# Patient Record
Sex: FEMALE | Race: WHITE | Employment: FULL TIME | URBAN - METROPOLITAN AREA
[De-identification: names, ages, dates, MRNs, and addresses within clinical notes are randomized per-mention and may not be internally consistent; named-entity substitution may affect disease eponyms.]

---

## 2019-04-10 ENCOUNTER — TELEPHONE (OUTPATIENT)
Dept: BEHAVIORAL HEALTH | Facility: CLINIC | Age: 54
End: 2019-04-10

## 2019-04-10 ENCOUNTER — HOSPITAL ENCOUNTER (INPATIENT)
Facility: CLINIC | Age: 54
LOS: 6 days | Discharge: HOME | DRG: 885 | End: 2019-04-16
Attending: EMERGENCY MEDICINE | Admitting: PSYCHIATRY & NEUROLOGY
Payer: COMMERCIAL

## 2019-04-10 DIAGNOSIS — G47.00 INSOMNIA, UNSPECIFIED TYPE: ICD-10-CM

## 2019-04-10 DIAGNOSIS — F41.9 ANXIETY: ICD-10-CM

## 2019-04-10 LAB
AMPHETAMINES UR QL SCN: NEGATIVE
ANION GAP SERPL CALCULATED.3IONS-SCNC: 7 MMOL/L (ref 3–14)
BARBITURATES UR QL: NEGATIVE
BENZODIAZ UR QL: NEGATIVE
BUN SERPL-MCNC: 12 MG/DL (ref 7–30)
CALCIUM SERPL-MCNC: 9.3 MG/DL (ref 8.5–10.1)
CANNABINOIDS UR QL SCN: NEGATIVE
CHLORIDE SERPL-SCNC: 107 MMOL/L (ref 94–109)
CO2 SERPL-SCNC: 26 MMOL/L (ref 20–32)
COCAINE UR QL: NEGATIVE
CREAT SERPL-MCNC: 0.8 MG/DL (ref 0.52–1.04)
ERYTHROCYTE [DISTWIDTH] IN BLOOD BY AUTOMATED COUNT: 12.8 % (ref 10–15)
GFR SERPL CREATININE-BSD FRML MDRD: 82 ML/MIN/{1.73_M2}
GLUCOSE SERPL-MCNC: 131 MG/DL (ref 70–99)
HCT VFR BLD AUTO: 40.2 % (ref 35–47)
HGB BLD-MCNC: 13.5 G/DL (ref 11.7–15.7)
INTERPRETATION ECG - MUSE: NORMAL
MCH RBC QN AUTO: 27.8 PG (ref 26.5–33)
MCHC RBC AUTO-ENTMCNC: 33.6 G/DL (ref 31.5–36.5)
MCV RBC AUTO: 83 FL (ref 78–100)
OPIATES UR QL SCN: NEGATIVE
PCP UR QL SCN: NEGATIVE
PLATELET # BLD AUTO: 259 10E9/L (ref 150–450)
POTASSIUM SERPL-SCNC: 3.7 MMOL/L (ref 3.4–5.3)
RBC # BLD AUTO: 4.86 10E12/L (ref 3.8–5.2)
SODIUM SERPL-SCNC: 140 MMOL/L (ref 133–144)
TSH SERPL DL<=0.005 MIU/L-ACNC: 1.06 MU/L (ref 0.4–4)
WBC # BLD AUTO: 7.6 10E9/L (ref 4–11)

## 2019-04-10 PROCEDURE — 84443 ASSAY THYROID STIM HORMONE: CPT | Performed by: EMERGENCY MEDICINE

## 2019-04-10 PROCEDURE — 93005 ELECTROCARDIOGRAM TRACING: CPT

## 2019-04-10 PROCEDURE — 80048 BASIC METABOLIC PNL TOTAL CA: CPT | Performed by: EMERGENCY MEDICINE

## 2019-04-10 PROCEDURE — 25000132 ZZH RX MED GY IP 250 OP 250 PS 637: Performed by: PSYCHIATRY & NEUROLOGY

## 2019-04-10 PROCEDURE — 99285 EMERGENCY DEPT VISIT HI MDM: CPT | Mod: 25

## 2019-04-10 PROCEDURE — 85027 COMPLETE CBC AUTOMATED: CPT | Performed by: EMERGENCY MEDICINE

## 2019-04-10 PROCEDURE — 12400000 ZZH R&B MH

## 2019-04-10 PROCEDURE — 90791 PSYCH DIAGNOSTIC EVALUATION: CPT

## 2019-04-10 PROCEDURE — 80307 DRUG TEST PRSMV CHEM ANLYZR: CPT | Performed by: PSYCHIATRY & NEUROLOGY

## 2019-04-10 RX ORDER — ACETAMINOPHEN 325 MG/1
650 TABLET ORAL EVERY 4 HOURS PRN
Status: DISCONTINUED | OUTPATIENT
Start: 2019-04-10 | End: 2019-04-16 | Stop reason: HOSPADM

## 2019-04-10 RX ORDER — HYDROXYZINE HYDROCHLORIDE 25 MG/1
25 TABLET, FILM COATED ORAL
Status: DISCONTINUED | OUTPATIENT
Start: 2019-04-10 | End: 2019-04-10

## 2019-04-10 RX ORDER — OLANZAPINE 10 MG/2ML
10 INJECTION, POWDER, FOR SOLUTION INTRAMUSCULAR
Status: DISCONTINUED | OUTPATIENT
Start: 2019-04-10 | End: 2019-04-10

## 2019-04-10 RX ORDER — PROPRANOLOL HYDROCHLORIDE 10 MG/1
10 TABLET ORAL 3 TIMES DAILY PRN
Status: ON HOLD | COMMUNITY
End: 2019-04-16

## 2019-04-10 RX ORDER — QUETIAPINE FUMARATE 25 MG/1
25 TABLET, FILM COATED ORAL AT BEDTIME
Status: DISCONTINUED | OUTPATIENT
Start: 2019-04-10 | End: 2019-04-12

## 2019-04-10 RX ORDER — BUSPIRONE HYDROCHLORIDE 10 MG/1
10 TABLET ORAL 3 TIMES DAILY
Status: ON HOLD | COMMUNITY
End: 2019-04-16

## 2019-04-10 RX ORDER — PROPRANOLOL HYDROCHLORIDE 10 MG/1
10 TABLET ORAL 3 TIMES DAILY PRN
Status: DISCONTINUED | OUTPATIENT
Start: 2019-04-10 | End: 2019-04-16 | Stop reason: HOSPADM

## 2019-04-10 RX ORDER — FAMOTIDINE 40 MG/1
40 TABLET, FILM COATED ORAL DAILY
Status: ON HOLD | COMMUNITY
End: 2019-04-16

## 2019-04-10 RX ORDER — QUETIAPINE FUMARATE 50 MG/1
25 TABLET, FILM COATED ORAL AT BEDTIME
Status: ON HOLD | COMMUNITY
End: 2019-04-16

## 2019-04-10 RX ORDER — FAMOTIDINE 20 MG/1
40 TABLET, FILM COATED ORAL DAILY
Status: DISCONTINUED | OUTPATIENT
Start: 2019-04-11 | End: 2019-04-11

## 2019-04-10 RX ORDER — QUETIAPINE FUMARATE 25 MG/1
25 TABLET, FILM COATED ORAL EVERY 4 HOURS PRN
Status: DISCONTINUED | OUTPATIENT
Start: 2019-04-10 | End: 2019-04-11

## 2019-04-10 RX ORDER — OLANZAPINE 10 MG/1
10 TABLET, ORALLY DISINTEGRATING ORAL
Status: DISCONTINUED | OUTPATIENT
Start: 2019-04-10 | End: 2019-04-10

## 2019-04-10 RX ORDER — HYDROXYZINE HYDROCHLORIDE 25 MG/1
25 TABLET, FILM COATED ORAL EVERY 4 HOURS PRN
Status: DISCONTINUED | OUTPATIENT
Start: 2019-04-10 | End: 2019-04-11

## 2019-04-10 RX ORDER — LANOLIN ALCOHOL/MO/W.PET/CERES
3 CREAM (GRAM) TOPICAL
Status: DISCONTINUED | OUTPATIENT
Start: 2019-04-10 | End: 2019-04-10

## 2019-04-10 RX ORDER — LORAZEPAM 1 MG/1
1 TABLET ORAL EVERY 8 HOURS PRN
Status: DISCONTINUED | OUTPATIENT
Start: 2019-04-10 | End: 2019-04-10

## 2019-04-10 RX ORDER — ACETAMINOPHEN 325 MG/1
650 TABLET ORAL EVERY 4 HOURS PRN
Status: DISCONTINUED | OUTPATIENT
Start: 2019-04-10 | End: 2019-04-10

## 2019-04-10 RX ORDER — BUSPIRONE HYDROCHLORIDE 10 MG/1
10 TABLET ORAL 3 TIMES DAILY
Status: DISCONTINUED | OUTPATIENT
Start: 2019-04-10 | End: 2019-04-11

## 2019-04-10 RX ADMIN — BUSPIRONE HYDROCHLORIDE 10 MG: 10 TABLET ORAL at 21:12

## 2019-04-10 RX ADMIN — QUETIAPINE 25 MG: 25 TABLET ORAL at 21:12

## 2019-04-10 RX ADMIN — PROPRANOLOL HYDROCHLORIDE 10 MG: 10 TABLET ORAL at 21:12

## 2019-04-10 ASSESSMENT — MIFFLIN-ST. JEOR
SCORE: 1234.13
SCORE: 1244.53

## 2019-04-10 ASSESSMENT — ENCOUNTER SYMPTOMS
TREMORS: 1
NERVOUS/ANXIOUS: 1
UNEXPECTED WEIGHT CHANGE: 1
SLEEP DISTURBANCE: 1
CONSTIPATION: 1
DIAPHORESIS: 1

## 2019-04-10 NOTE — TELEPHONE ENCOUNTER
S: Pt is a 52 yo female in the  ED for anxiety    B: Pt BIB . High anxiety since January. Missing work. Night sweats, poor sleep, changed meds 4 times and nothing has worked. Symptoms worsening. Therapist recommended ED for assessment. No hx of IP MH. Last period ended about same time as when symptoms started so is going through menopause. 4 children,  present and supportive. No SI or SIB. No HI. Biggest concern is racing heart and only sleeping about 3 hours per day. Pt wants to get back to normal life. Pt is not Tachycardic. Pt states symptoms of depression are related to getting no relief from anxiety. Pt appropriate for stepdown admission. No behavioral concerns.     A: No medical concerns, voluntary. Labs within normal parameters.     R: Saira/Chandra

## 2019-04-10 NOTE — PROGRESS NOTES
Celia Kapoor, presented to the ER with anxiety.  She said that anxiety started in January, where she started having symptoms of someone pocking her on her arms and feeling very anxious. Her primary Doctor started her on a medication can not recall the name, that medication gave her insomnia and motion sickness. Then she was switched to another medications , again can not recall the name ,  This medication gave her night sweats and worsening insomnia.then she started taking Lexapro and she developed shakiness . Her Dr, grew impatient with her and he told her to continue taking the medications but she stopped.   She was started on Buspar, and going to see a counselor, her symptoms are worse: Chest pounding, unable to sleep for more than 2 hours, increased anxiety and night sweats.   She denies any personal problems, has a good relationship with her , she has not been able to work because she can only sleep 2 to 3 hours at night.        Today , she saw her counselor, who told her that they can not help her anymore because she is not complaint with medications and they do not have the resources to help her and suggested she come to the Emergency Department.    Denies any suicidal ideation,  She just wants to get better.  Admission completed.

## 2019-04-10 NOTE — PHARMACY-ADMISSION MEDICATION HISTORY
Admission medication history interview status for the 4/10/2019  admission is complete. See EPIC admission navigator for prior to admission medications     Medication history source reliability:Good    Actions taken by pharmacist (provider contacted, etc):pt has Rx bottles here with her      Additional medication history information not noted on PTA med list :None    Medication reconciliation/reorder completed by provider prior to medication history? No    Time spent in this activity: 15 min    Prior to Admission medications    Medication Sig Last Dose Taking? Auth Provider   busPIRone (BUSPAR) 10 MG tablet Take 10 mg by mouth 3 times daily 4/10/2019 at Unknown time Yes Unknown, Entered By History   famotidine (PEPCID) 40 MG tablet Take 40 mg by mouth daily 4/9/2019 at am Yes Unknown, Entered By History   methylcellulose (CITRUCEL) 500 MG TABS tablet Take 1,000 mg by mouth daily as needed 4/10/2019 at Unknown time Yes Unknown, Entered By History   propranolol (INDERAL) 10 MG tablet Take 10 mg by mouth 3 times daily as needed not started Yes Unknown, Entered By History   QUEtiapine (SEROQUEL) 50 MG tablet Take 25 mg by mouth At Bedtime 4/9/2019 at Unknown time Yes Unknown, Entered By History

## 2019-04-10 NOTE — ED TRIAGE NOTES
"Patient states, \"I was diagnosed with anxiety and they started my on some pills. I haven't been able to sleep for days and I feel like my heart is racing, my body hurts, I sweat like 3 times per night and I just need to be stabilized.\"  "

## 2019-04-10 NOTE — ED PROVIDER NOTES
"  History     Chief Complaint:    Anxiety    HPI   Celia Zhong is a 53 year old female who presents with anxiety. The patient reports that on January 22 2019 she was evaluated by her primary care physician because she was \"not feeling right\" where she was told that she had anxiety and prescribed anti-anxiety medications, of which she could not recall the name but notes that it made her have insomnia and motion sickness. She was then switched to another medication but details that it made her have night sweats and worsening insomnia. Finally, the patient states that she was started on a 20 day trial of Lexapro and after taking it for a few days she \"laid on the floor\" from the side affects as it \"kicked me in the butt\" and she was experiencing shaking. She then stopped taking the medication despite her physician telling her that if she kept taking it, it would eventually help and she recalls she was told \"it's me\" that is having the issues with the medications. The patient restarted her medication when she met with a counselor but still states that she was experiencing night sweats and adverse side affects therefore switched to Buspar on March 12. The patient annotates that she feels that the medication's aren't working and she her \"chest is pounding\" with decreased sleep to 2-3 hours a night and night sweats. She reports that \"all I want to do is sleep\" and it is affecting her work and quality of life. The patient's  dictates that at night when she tries to sleep, she starts to think of \"all the problems\" and therefore can not sleep and that the patient is not acting herself. Today, the patient was seen by her counselor who told her that they can not help her anymore because he is noncompliant and they do not have the resources she needs, therefore to come to the emergency department. She endorses also being constipated and having lost 25 pounds and is concerned there is something physically wrong with " "her. She denies suicidal or self harm, reporting that \"I just want to get better\".     Allergies:  No known drug allergies.       Medications:    Buspar     Past Medical History:    Anxiety    Past Surgical History:    Tubal ligation     Family History:    Family history reviewed. No pertinent family history.     Social History:  The patient was accompanied to the ED by .  Smoking Status: Never Smoker  Smokeless Tobacco: Never Used  Alcohol Use: Positive  Drug Use: Negative  Marital Status:        Review of Systems   Constitutional: Positive for diaphoresis (night sweats) and unexpected weight change.        Motion sickness   Gastrointestinal: Positive for constipation.   Neurological: Positive for tremors.   Psychiatric/Behavioral: Positive for sleep disturbance. Negative for self-injury and suicidal ideas. The patient is nervous/anxious.    All other systems reviewed and are negative.    Physical Exam     Patient Vitals for the past 24 hrs:   BP Temp Pulse Resp SpO2 Height Weight   04/10/19 1217 (!) 166/91 98.4  F (36.9  C) 77 18 99 % 1.6 m (5' 3\") 68 kg (150 lb)      Physical Exam  Eyes:  The pupils are equal and round    Conjunctivae and sclerae are normal  ENT:    The nose is normal    Pinnae are normal  CV:  Regular rate and rhythm     No edema  Resp:  Lungs are clear    Non-labored    No rales    No wheezing   GI:  Abdomen is soft and non-tender, there is no rigidity    No distension  MS:  Normal muscular tone    No asymmetric leg swelling  Skin:  No rash or acute skin lesions noted  Neuro:   Awake, alert.      Speech is normal and fluent.    Face is symmetric.     Moves all extremities  Psych:  Anxious appearing. Tearful.  Denies SI/HI.      Emergency Department Course     ECG: Pending    Laboratory:  Laboratory findings were communicated with the patient who voiced understanding of the findings.    CBC: WBC 7.6, HGB 13.5,   BMP: Glucose 131 (H) o/w WNL (Creatinine 0.80)  TSH with free " T4 reflex: 1.06     Interventions:  Medications - No data to display     Emergency Department Course:     Nursing notes and vitals reviewed.    1239 I performed an exam of the patient as documented above.     1301 IV was inserted and blood was drawn for laboratory testing, results above.      I personally reviewed the lab r esults with the patient and answered all related questions prior to sign out to Dr. Cagle pending DEC evaluation.    Impression & Plan      Medical Decision Making:  Celia Zhong is a 53 year old female who presents to the emergency department today for evaluation of anxiety.  She has been having difficulty sleeping and finding medications that agree with her over the past several months.  She is been seen by a counselor and psychiatrist.  She just was switched to BuSpar last month.  She came here to the emergency department with several concerns.  She is been feeling palpitations.  She said she just had a normal EKG done yesterday.  She denies feeling suicidal.  She denies feeling unsafe.  We will have our mental health  evaluate the patient and try to determine if there are any appropriate outpatient resources available for her.  Laboratory workup here is reassuring.   Patient discussed with DEC. Due to severity of symptoms, will have patient admitted voluntarily.  Patient signed out to my partner awaiting admission.    Diagnosis:      ICD-10-CM    1. Anxiety F41.9    2. Insomnia, unspecified type G47.00         Disposition:   The patient is signed out to my colleague, Dr. Cagle, pending DEC evaluation.     Scribe Disclosure:  I, Orla Severson, am serving as a scribe at 12:51 PM on 4/10/2019 to document services personally performed by Doug Llanos MD based on my observations and the provider's statements to me.      EMERGENCY DEPARTMENT       Doug Llanos MD  04/10/19 8215

## 2019-04-11 PROCEDURE — 90853 GROUP PSYCHOTHERAPY: CPT

## 2019-04-11 PROCEDURE — 99222 1ST HOSP IP/OBS MODERATE 55: CPT | Performed by: PHYSICIAN ASSISTANT

## 2019-04-11 PROCEDURE — 25000132 ZZH RX MED GY IP 250 OP 250 PS 637: Performed by: PSYCHIATRY & NEUROLOGY

## 2019-04-11 PROCEDURE — 90791 PSYCH DIAGNOSTIC EVALUATION: CPT

## 2019-04-11 PROCEDURE — 12400000 ZZH R&B MH

## 2019-04-11 RX ORDER — MIRTAZAPINE 7.5 MG/1
7.5 TABLET, FILM COATED ORAL AT BEDTIME
Status: DISCONTINUED | OUTPATIENT
Start: 2019-04-11 | End: 2019-04-12

## 2019-04-11 RX ORDER — FAMOTIDINE 20 MG/1
40 TABLET, FILM COATED ORAL DAILY PRN
Status: DISCONTINUED | OUTPATIENT
Start: 2019-04-11 | End: 2019-04-16 | Stop reason: HOSPADM

## 2019-04-11 RX ADMIN — BUSPIRONE HYDROCHLORIDE 10 MG: 10 TABLET ORAL at 08:31

## 2019-04-11 RX ADMIN — FAMOTIDINE 40 MG: 20 TABLET ORAL at 08:31

## 2019-04-11 RX ADMIN — QUETIAPINE 12.5 MG: 25 TABLET, FILM COATED ORAL at 13:26

## 2019-04-11 RX ADMIN — QUETIAPINE 12.5 MG: 25 TABLET, FILM COATED ORAL at 17:01

## 2019-04-11 RX ADMIN — MIRTAZAPINE 7.5 MG: 7.5 TABLET ORAL at 21:04

## 2019-04-11 RX ADMIN — QUETIAPINE 25 MG: 25 TABLET ORAL at 21:04

## 2019-04-11 ASSESSMENT — ACTIVITIES OF DAILY LIVING (ADL)
LAUNDRY: WITH SUPERVISION
HYGIENE/GROOMING: INDEPENDENT
ORAL_HYGIENE: INDEPENDENT
DRESS: STREET CLOTHES
ORAL_HYGIENE: INDEPENDENT
HYGIENE/GROOMING: INDEPENDENT
DRESS: STREET CLOTHES

## 2019-04-11 NOTE — H&P
[facility] Psychiatric H&P Note       Initial History     The patient's care was discussed with the treatment team and chart notes were reviewed.     Patient examined for psychiatric admission.     IDENTIFICATION  Patient is a 53 year old  female who resides in [place], [place]. She is a mother of 4 children. Pt sees PCP Nicho Berrios. Pt sees Marychuy Burns NP for medication management. Pt seen on 4/11/19 by Dr. Artis.    CHIEF COMPLAINT  Significant increase in anxiety     HISTORY OF PRESENT ILLNESS  Patient does not acquire any form of psychiatric history. She was presented to [facility] ED on 4/10/19 for evaluation of anxiety. Patient has been experiencing an increase in anxiety, difficulties in sleeping, and finding the right psychiatric medication to treat her symptoms of anxiety. Patient was recently started on her first anti-anxiety medication in January 22 2019 by her PCP, however this medication ended up causing patient to experience insomnia and motion sickness. She was then started on a different medication, however this medication caused night sweats and worsening insomnia. After this, the patient was started on a 20 day trial of Lexapro. Patient apparently suffered from severe side effects that made her lay on the floor and shaky. Patient discontinued Lexapro on her own despite her PCP encouraging her not to. She then tried the medication Buspar around the beginning of March, which again caused side effects such as difficulties in sleep (only receiving approximately 2-3 hours a night) and night sweats. When discussing with ED, the patient noted that she has not felt suicidal or attained SI, however all she wants to do is sleep, which effects her ability to work and quality of life. Apparently the patient has missed 15+ days of work, has lost about 25 pounds, and suffers from constipation. She believes something is physically wrong with her. Patient was deemed appropriate for  inpatient mental health care in order to address her anxiety. On interview with Dr. Artis this afternoon, the patient first reports that she has never been an anxious person or experienced any symptoms of anxiety up until 2 months ago where she was started on anti-anxiety medications. Patient states she has experienced a racing heart, night sweats, high perspiration and has been concerned about her physical health even after she has been deemed healthy other than obtaining a higher blood pressure. Dr. Artis would like to order Seroquel 12.5-25mg every 2 hr PRN and Remeron 7.5mg at bedtime in order to aid in patient's persistent anxiety. She is in agreement with this medication adjustment. In addition to this, Dr. Artis and patient discuss the possibility of her attending an intensive outpatient program such as [facility]. Patient will consider this option.     CHEMICAL DEPENDENCY HISTORY  No history of chemical dependency. Denies alcohol or illicit drug use.     PAST  PSYCHIATRIC HISTORY  No history of inpatient mental health hospitalizations. No previous ED visits. Patient currently sees a therapist and Marychuy Burns NP for medication management.    Previous psychiatric medications include: Lexapro (made her shaky) and Atarax   Prior to admission medications include: Buspar (has been taking this medication intermittently)     FAMILY HISTORY  Denies any form of family history of chemical dependency or mental health.     SOCIAL HISTORY  Patient grew up in [place] with 10 other siblings. She grew up with both parents who were very supportive during her childhood. She graduated high school and then went on to attain small jobs. Patient never attended any further schooling. Patient has been  for 32 years and has 4 adult children. Patient works as an administrative clerical worker. She currently resides with her  in [place], [place].      Medications     Medications Prior  "to Admission   Medication Sig Dispense Refill Last Dose     busPIRone (BUSPAR) 10 MG tablet Take 10 mg by mouth 3 times daily   4/10/2019 at Unknown time     famotidine (PEPCID) 40 MG tablet Take 40 mg by mouth daily   4/9/2019 at am     methylcellulose (CITRUCEL) 500 MG TABS tablet Take 1,000 mg by mouth daily as needed   4/10/2019 at Unknown time     propranolol (INDERAL) 10 MG tablet Take 10 mg by mouth 3 times daily as needed   not started     QUEtiapine (SEROQUEL) 50 MG tablet Take 25 mg by mouth At Bedtime   4/9/2019 at Unknown time       Scheduled Medications:    busPIRone  10 mg Oral TID     famotidine  40 mg Oral Daily     QUEtiapine  25 mg Oral At Bedtime     PRNs:  acetaminophen, hydrOXYzine, methylcellulose, propranolol, QUEtiapine      Allergies      No Known Allergies     Previous Medical History     Past Medical History:   Diagnosis Date     Anxiety         Medical Review of Systems     BP (!) 139/92   Pulse 75   Temp 97.8  F (36.6  C) (Oral)   Resp 16   Ht 1.6 m (5' 3\")   Wt 67 kg (147 lb 11.3 oz)   SpO2 98%   BMI 26.17 kg/m    Body mass index is 26.17 kg/m .    Previous 10-point ROS completed by Doug Llanos MD on 4/10/19 reviewed by Osei Artis MD on April 11, 2019 and is unchanged except for those problems mentioned within the HPI.     Mental Status Examination     Appearance Sitting in chair, dressed in normal clothing. Appears stated age.   Attitude Cooperative   Orientation Oriented to person, place, time   Eye Contact Good    Speech Regular rate, rhythm, volume and tone   Language Normal   Psychomotor Behavior Normal   Mood Anxious and tearful    Affect Pleasant, but anxious    Thought Process Goal-Oriented, Intact   Associations Intact   Thought Content Patient is currently negative for suicidal ideation, negative for plan or intent, able to contract no self harm and identify barriers to suicide.  Negative for obsessions, compulsions or psychosis.     Fund of " Knowledge Intact   Insight Intact   Judgement Intact   Attention Span & Concentration Intact   Recent & Remote Memory Intact   Gait Normal   Muscle Tone Intact      Labs     Labs reviewed.  Recent Results (from the past 24 hour(s))   CBC with platelets    Collection Time: 04/10/19  1:01 PM   Result Value Ref Range    WBC 7.6 4.0 - 11.0 10e9/L    RBC Count 4.86 3.8 - 5.2 10e12/L    Hemoglobin 13.5 11.7 - 15.7 g/dL    Hematocrit 40.2 35.0 - 47.0 %    MCV 83 78 - 100 fl    MCH 27.8 26.5 - 33.0 pg    MCHC 33.6 31.5 - 36.5 g/dL    RDW 12.8 10.0 - 15.0 %    Platelet Count 259 150 - 450 10e9/L   Basic metabolic panel    Collection Time: 04/10/19  1:01 PM   Result Value Ref Range    Sodium 140 133 - 144 mmol/L    Potassium 3.7 3.4 - 5.3 mmol/L    Chloride 107 94 - 109 mmol/L    Carbon Dioxide 26 20 - 32 mmol/L    Anion Gap 7 3 - 14 mmol/L    Glucose 131 (H) 70 - 99 mg/dL    Urea Nitrogen 12 7 - 30 mg/dL    Creatinine 0.80 0.52 - 1.04 mg/dL    GFR Estimate 82 >60 mL/min/[1.73_m2]    GFR Estimate If Black >90 >60 mL/min/[1.73_m2]    Calcium 9.3 8.5 - 10.1 mg/dL   TSH with free T4 reflex    Collection Time: 04/10/19  1:01 PM   Result Value Ref Range    TSH 1.06 0.40 - 4.00 mU/L   EKG 12-lead, tracing only    Collection Time: 04/10/19  7:15 PM   Result Value Ref Range    Interpretation ECG Click View Image link to view waveform and result    Drug abuse screen 77 urine    Collection Time: 04/10/19  9:15 PM   Result Value Ref Range    Amphetamine Qual Urine Negative NEG^Negative    Barbiturates Qual Urine Negative NEG^Negative    Benzodiazepine Qual Urine Negative NEG^Negative    Cannabinoids Qual Urine Negative NEG^Negative    Cocaine Qual Urine Negative NEG^Negative    Opiates Qualitative Urine Negative NEG^Negative    PCP Qual Urine Negative NEG^Negative          Impression   This is a 53 year old female with no previous psychiatric history. She was initially brought to [facility] with her  for evaluation of  anxiety. The patient has been experiencing worsening anxiety the past few months and having difficulties in sleep and finding the right psychiatric medications. Patient has not been suicidal, however is becoming increasingly frustrated with her situation. She has missed 15+ days of work, has lost 25 pounds, and has been suffering from constipation. Patient believes something is physically wrong with her, which has affected her quality of life. She was deemed appropriate for inpatient care in order to address this persistent anxiety. When discussing with Dr. Artis this afternoon, the patient denied ever attaining symptoms of anxiety up until 2 months ago. She has pressing concerns about this anxiety and how it has effected her physical health. Patient does endorse symptoms of generalized anxiety that include restlessness, feeling on edge, and severe sleep disturbance. Dr. Artis has ordered the patient Seroquel 12.5-25mg every 2 hrs PRN and Remeron 7.5mg at bedtime all in hopes to aid in patient's persistent severe anxiety. In addition to this, [facility] intensive outpatient program was discussed in detail. Patient would like to consider this plan due to the location being farther away from her home in [facility].       Diagnoses     1. Generalized Anxiety Disorder      Plan     1. Explained side effects, benefits, and complications of medications to the patient, Pt gave verbal consent.  2. Medication changes: Ordered Seroquel 12.5-25mg every 2 hrs PRN, discontinued Buspar, changed Pepcid to daily PRN, and started Remeron 7.5mg at bedtime   3. Discussed treatment plan with patient and team.  4. Projected length of stay: 7+ days  5. Discussed [facility] program in [place], [place]      Attestation:   Patient has been seen and evaluated by me, Osie Artis MD.    Patient ID:  Name: Celia Zhong MRN: [id number]  Admission: 4/10/2019 YOB: 1965

## 2019-04-11 NOTE — PROGRESS NOTES
Jeans burgundy shirt  3 socks  4 underwear    Admission:  I am responsible for any personal items that are not sent to the safe or pharmacy. [facility] is not responsible for loss, theft or damage of any property in my possession.        Patient Signature: ___________________________________________      Date/Time:__________________________     Staff Signature: __________________________________      Date/Time:__________________________     2nd Staff person, if patient is unable/unwilling to sign:      __________________________________________________________      Date/Time: __________________________        Discharge:  [facility] has returned all of my personal belongings:     Patient Signature: ________________________________________     Date/Time: ____________________________________     Staff Signature: ______________________________________     Date/Time:_____________________________________

## 2019-04-11 NOTE — H&P
Case Management Psycho-Social Assessment    This information has been obtained from the patient's chart and from a personal interview with the patient.     Reason for Admission: Admitted to hospital with increased anxiety; difficulty functioning.    Previous Mental & Chemical Health: No previous mental health hospitalizations. Briefly saw a therapist and medication management provider at [facility] in [place].  Denies drug use. Alcohol use maybe 3 beers if out with  on week end. No CD treatment history.    Family History:  Grew up in [place], [place] in an intact family, the youngest of 11 children- 7 sisters and 3 brothers, the eldest  in [facility]. Parents,Helio and Ramona, are in their 90's.  Patient is  for 32 years to Osei, who she considers a supportive partner. The couple has 4 adult children, 2 sons and 2 daughters and 3 grandchildren.  History of abuse or trauma denied.    Current Living Situation:   Lives with her  in [place], [place].    Education and Work History:  Graduated from [employer] in . Current job is - 7 years. 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  No  service. Grew up [employer]- non-practicing now.  Enjoys TV, caring for the house going to [place] to exercise and going to yoga.     Insurance:  [employer]    Legal Issues / Guardian :   Legal issues denied. Patient is her own guardian.    SS Assessment Needs & Plan:  Patient is anxious about the medications she is on and sought reassurance that she can recover. She is willing to consider DBT- yet worries about missing work. Reviewed some breathing exercises.

## 2019-04-11 NOTE — ED PROVIDER NOTES
Sign out note:  Pt signed out to me. No complications. Admitting voluntarily to Psych.    ECG:  ECG taken at 1915, ECG read at 1926  Normal sinus rhythm  Septal infarct, age undetermined  Abnormal ECG  Rate 72 bpm. NM interval 150 ms. QRS duration 76 ms. QT/QTc 390/427 ms. P-R-T axes 7 29 61.    ED Course  1400 The care of this patient was signed out to my partner Dr. Carolyn Ding Disclosure:  I, Pati Madrid, am serving as a scribe at 7:54 PM on 4/10/2019 to document services personally performed by Matilde Cagle MD based on my observations and the provider's statements to me.      MD Gurjit Mesa Katherine Collins, MD  04/10/19 [id number]

## 2019-04-11 NOTE — PROGRESS NOTES
04/11/19 1200   General Information   Date Initially Attended OT 04/11/19   Clinical Impression   Affect Appropriate to situation   Orientation Oriented to person, place and time   Appearance and ADLs General cleanliness observed in most areas   Attention to Internal Stimuli No observed signs   Interaction Skills Interacts appropriately with staff;Interacts appropriately with peers   Ability to Communicate Needs Does so with prompts   Verbal Content Appropriate to topic   Ability to Maintain Boundaries Maintains appropriate physical boundaries;Maintains appropriate verbal boundaries   Participation Participates with minimal encouragement   Concentration Concentrates 5-10 minutes   Ability to Concentrate With structure   Follows and Comprehends Directions Independently follows multi-step directions   Memory Delayed and immediate recall intact   Organization Independently organizes medium tasks   Decision Making Needs further assessment   Planning and Problem Solving Needs further assessment   Ability to Apply and Learn Concepts Applies within group structure   Frustrations / Stress Tolerance Needs further assessment   Level of Insight Insightful into needs, issues, goals   Self Esteem Can identify positives   Social Supports Has knowledge of support systems   General Observation/Plan   General Observations/Plan See Comments     INITIAL O.T. ASSESSMENT:      Pt transitioned to OT group with MIN encouragement. Pt engaged in therapeutic activity addressing functional cognition and interpersonal skills with task set up. Pt participated in therapeutic group activity and discussion addressing wellness. Pt ID'd positive things she can do to fill her time (read, walk, play card games) and words that describe her when she's feeling well (happy, loving, carefree). Pt will continue to benefit from OT intervention to address implementation of positive functional coping skills, role performance, and community reintegration.       OT assessment completed by semi-structured interview and direct observation. Cited anxiety as the reason for current hospitalization. Goals ID'd include to ID and implement new coping skills and to better ID and express feelings. OT staff explained the purpose of pt being involved in current treatment plan.      Plan: Encourage ongoing attendance as able to meet self-stated goals, implement positive coping skills, engage in therapeutic activities, and provide assessment ongoing.

## 2019-04-11 NOTE — H&P
Admitted:     04/10/2019      PRIMARY CARE PROVIDER:  Dr. Nicho Mata      CHIEF COMPLAINT:  Anxiety.      HISTORY OF PRESENT ILLNESS:  Celia Zhong is a 53-year-old female with no significant past medical history, who presented to the Emergency Department at [facility] on 04/10/2019 for evaluation of worsening anxiety.  The patient reports a 3-month history of severe anxiety symptoms, stating that suddenly she had a significant amount of stress at work, which she feels caught up with her.  She has been experiencing insomnia as well as mind racing, thoughts racing, and intermittent panic attacks.  She initially was evaluated by her primary care provider and was started on Lexapro, which did not make her feel better after approximately 4 days, unless she decided to stop that medication.  She reports that she thought it would be a quick fix and all that Lexapro did was make her increasingly shaky.  She did represent to her primary care provider, who then trialed BuSpar; however, patient has been intermittently taking this medication as well and reports continued symptoms of anxiety and insomnia.  She was referred to a counselor, who she has seen a handful of times and does not appear to be improving.  Out of concern of ongoing anxiety and lack of improvement, she elected to present to the Emergency Department.      On arrival in the Emergency Department, the patient was seen by Dr. Llanos.  She was evaluated with EKG and basic laboratory studies, including a CBC, BMP and TSH, which were unremarkable.  Urine drug screen was also performed and was negative.  EKG was unremarkable with normal sinus rhythm, QTc of 427.  After evaluation by DEC, the patient was recommended to be admitted to inpatient psychiatry on a voluntary basis, which she was agreeable.      The patient presently is evaluated in the step-down side of inpatient psychiatry.  She currently reports that she is feeling slightly improved  from prior.  She does report that she was able to sleep and get some rest last evening.  She reports that she just wants to feel well and feel better.  She does note that she has been unable to run simple errands, such as going to the grocery store.  Over the past 2 months due to her severe anxiety, she has essentially been housebound.  She does report intermittent sensations of chest tightness with heart racing, which she reports she has been evaluated for multiple times and has always been told it is anxiety.      PAST MEDICAL HISTORY:  Anxiety, recently diagnosed.      SURGICAL HISTORY:  Tubal ligation.      PRIOR TO ADMISSION MEDICATIONS:   1.  BuSpar 10 mg 3 times daily.   2.  Pepcid 40 mg daily.   3.  Citrucel 500 mg daily as needed.   4.  Propranolol 10 mg 3 times daily as needed.   5.  Seroquel 25 mg at bedtime.      ALLERGIES:  NO KNOWN DRUG ALLERGIES.      FAMILY HISTORY:  The patient reports anxiety in father, as well as siblings, none of whom are in actual treatment to require medication.      SOCIAL HISTORY:  The patient currently lives in a single family home with her .  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  She is a nonsmoker.  She consumes alcohol in the form of 1-2 beers each weekend.      REVIEW OF SYSTEMS:  A 10-point review of systems was performed and is otherwise negative.  Please refer to the HPI.      PHYSICAL EXAMINATION:   VITAL SIGNS:  Temperature of 97.8, heart rate of 76, respiratory rate 16, blood pressure 139/92, SpO2 98% on room air.   GENERAL:  A well-developed, well-nourished female, who appears comfortable.   HEENT:  Head is normocephalic.  Pupils are equal, round, and react to light bilaterally.  EOMs are intact bilaterally.  Nose and mouth are patent.  Mucous membranes are moist.   LUNGS:  Clear to auscultation bilaterally without wheeze or crackles.   CARDIOVASCULAR:  Regular rate and rhythm, normal S1 and S2.   GASTROINTESTINAL:  Abdomen is soft,  nontender, nondistended.  Positive bowel sounds located diffusely.   NEUROLOGIC:  The patient is spontaneously moving bilateral upper and lower extremities.  Gait is normal.  Sensation is grossly intact to light touch.   SKIN:  Warm and dry, no rash, no pedal edema.      LABORATORY AND IMAGING:  As reviewed in Epic and as in HPI.      ASSESSMENT AND PLAN:  Celia Alcantara is a 53-year-old female with no significant past medical history, who presented to the Emergency Department for evaluation of acute worsening of recently diagnosed anxiety.  The patient was admitted under Inpatient Psychiatry for further evaluation and treatment.      1.  Anxiety:  Patient with recent diagnosis and has trialed 2 medications as an outpatient, although has admittedly been medically noncompliant.  She has also tried therapy a handful of times without improvement.  Does report she is starting to feel slightly better with medications provided this admission.  At this time, we will ask Inpatient Psychiatry to evaluate and treat.   2.  Subjective tachycardia: pt reporting intermittent sensations of heart racing. EKG this admission indicating NSR without ST-T wave changes. Electrolytes, TSH wnl. Likely related to anxiety.  3.  Gastroesophageal reflux disease:  The patient to continue on prior to admission Pepcid.   4.  Deep venous thrombosis prophylaxis:  Ambulation.      The patient is a FULL CODE.      This patient was staffed with Dr. Chung Padilla, who independently interviewed and evaluated patient and is in agreement with the above-mentioned plan.         CHUNG PADILLA MD       As dictated by ABHIJIT NAIR PA-C            D: 2019   T: 2019   MT: MARY KATE      Name:     CELIA ALCANTARA   MRN:      [id number]        Account:      [id number]   :      1965        Admitted:     04/10/2019                   Document: [id number]       cc: Nicho Mata MD

## 2019-04-12 PROCEDURE — 90853 GROUP PSYCHOTHERAPY: CPT

## 2019-04-12 PROCEDURE — 25000132 ZZH RX MED GY IP 250 OP 250 PS 637: Performed by: PSYCHIATRY & NEUROLOGY

## 2019-04-12 PROCEDURE — 12400000 ZZH R&B MH

## 2019-04-12 RX ORDER — QUETIAPINE FUMARATE 50 MG/1
50 TABLET, FILM COATED ORAL AT BEDTIME
Status: DISCONTINUED | OUTPATIENT
Start: 2019-04-12 | End: 2019-04-12

## 2019-04-12 RX ORDER — QUETIAPINE FUMARATE 100 MG/1
100 TABLET, FILM COATED ORAL AT BEDTIME
Status: DISCONTINUED | OUTPATIENT
Start: 2019-04-12 | End: 2019-04-15

## 2019-04-12 RX ORDER — MIRTAZAPINE 15 MG/1
15 TABLET, FILM COATED ORAL AT BEDTIME
Status: DISCONTINUED | OUTPATIENT
Start: 2019-04-12 | End: 2019-04-15

## 2019-04-12 RX ADMIN — QUETIAPINE 25 MG: 25 TABLET, FILM COATED ORAL at 13:02

## 2019-04-12 RX ADMIN — QUETIAPINE 25 MG: 25 TABLET, FILM COATED ORAL at 08:23

## 2019-04-12 RX ADMIN — QUETIAPINE 25 MG: 25 TABLET, FILM COATED ORAL at 16:38

## 2019-04-12 RX ADMIN — METHYLCELLULOSE 1000 MG: 500 TABLET ORAL at 16:47

## 2019-04-12 RX ADMIN — QUETIAPINE 25 MG: 25 TABLET, FILM COATED ORAL at 11:12

## 2019-04-12 RX ADMIN — QUETIAPINE FUMARATE 100 MG: 100 TABLET ORAL at 21:12

## 2019-04-12 RX ADMIN — MIRTAZAPINE 15 MG: 15 TABLET, FILM COATED ORAL at 21:12

## 2019-04-12 RX ADMIN — PROPRANOLOL HYDROCHLORIDE 10 MG: 10 TABLET ORAL at 08:22

## 2019-04-12 RX ADMIN — QUETIAPINE 25 MG: 25 TABLET, FILM COATED ORAL at 04:06

## 2019-04-12 ASSESSMENT — ACTIVITIES OF DAILY LIVING (ADL)
HYGIENE/GROOMING: INDEPENDENT
ORAL_HYGIENE: INDEPENDENT
DRESS: INDEPENDENT

## 2019-04-12 NOTE — PROGRESS NOTES
While doing rounds, writer walked in on patient standing in the dark. Patient stated that she knows she is breathing but that it feels like she is only getting shallow breaths. Writer observed patient breathing at a normal depth and rate. Patient expressed concerns of taking medications. Writer explained the typical side effects are GI issues, and headaches and that some of the other more serious side effects are 1 out of many people. Pt took prn seroquel 25mg and was given essential oil to place in her room.

## 2019-04-12 NOTE — PROGRESS NOTES
[facility] Psychiatric Progress Note       Interim History     The patient's care was discussed with the treatment team and chart notes were reviewed. Per attending hospital staff, the patient has been medication compliant, cooperative in care, pleasant to her peers, and respectful to staff. She expressed some concerns in regards of taking any psychiatric medication, however was given a list of her medications in so that she may review them in further detail. Patient has appeared anxious and has reported feeling short of breath even though her heart rate has been checked and deemed normal every time. Pt seen on 4/12/19 by Dr. Artis. On interview, the patient states she slept good last night, however woke up around 4:00am and needed to take one Seroquel 25mg PRN. Dr. Artis would like to increase Seroquel scheduled dose to 100mg at bedtime and Remeron dose from 7.5mg to 15mg at bedtime. Dr. Artis and patient also review patient's aftercare plans that allow the patient to care for her mental health. [facility] is looked into. Patient is willing to try this program and will need to undergo FFMLA.      Hospital Course   This is a 53 year old female with no previous psychiatric history. She was initially brought to [facility] with her  for evaluation of anxiety. The patient has been experiencing worsening anxiety the past few months and having difficulties in sleep and finding the right psychiatric medications. Patient has not been suicidal, however is becoming increasingly frustrated with her situation. She has missed 15+ days of work, has lost 25 pounds, and has been suffering from constipation. Patient believes something is physically wrong with her, which has affected her quality of life. She was deemed appropriate for inpatient care in order to address this persistent anxiety. When discussing with Dr. Artis this afternoon, the patient denied ever  "attaining symptoms of anxiety up until 2 months ago. She has pressing concerns about this anxiety and how it has effected her physical health. Patient does endorse symptoms of generalized anxiety that include restlessness, feeling on edge, and severe sleep disturbance. Dr. Artis has ordered the patient Seroquel 12.5-25mg every 2 hrs PRN and Remeron 7.5mg at bedtime all in hopes to aid in patient's persistent severe anxiety. In addition to this, [facility] intensive outpatient program was discussed in detail. Patient would like to consider this plan due to the location being farther away from her home in [facility].         Medications     Current Facility-Administered Medications Ordered in Epic   Medication Dose Route Frequency Last Rate Last Dose     acetaminophen (TYLENOL) tablet 650 mg  650 mg Oral Q4H PRN         famotidine (PEPCID) tablet 40 mg  40 mg Oral Daily PRN         methylcellulose (CITRUCEL) tablet 1,000 mg  1,000 mg Oral Daily PRN         mirtazapine (REMERON) tablet TABS 7.5 mg  7.5 mg Oral At Bedtime   7.5 mg at 04/11/19 2104     propranolol (INDERAL) tablet 10 mg  10 mg Oral TID PRN   10 mg at 04/10/19 2112     QUEtiapine (SEROquel) half-tab 12.5-25 mg  12.5-25 mg Oral Q2H PRN   25 mg at 04/12/19 0406     QUEtiapine (SEROquel) tablet 25 mg  25 mg Oral At Bedtime   25 mg at 04/11/19 2104     No current [facility]-ordered outpatient medications on file.         Allergies      No Known Allergies     Medical Review of Systems     BP (!) 130/98   Pulse 80   Temp 97  F (36.1  C) (Oral)   Resp 16   Ht 1.6 m (5' 3\")   Wt 67 kg (147 lb 11.3 oz)   SpO2 98%   BMI 26.17 kg/m    Body mass index is 26.17 kg/m .  A 10-point review of systems was performed by Osei Artis MD and is negative, no new findings.      Psychiatric Examination     Appearance Sitting in chair, dressed in normal clothing. Appears stated age.   Attitude Cooperative   Orientation Oriented to person, place, time   Eye " Contact Good    Speech Regular rate, rhythm, volume and tone   Language Normal   Psychomotor Behavior Normal   Mood Anxious   Affect Pleasant, less anxious    Thought Process Goal-Oriented, Intact   Associations Intact   Thought Content Patient is currently negative for suicidal ideation, negative for plan or intent, able to contract no self harm and identify barriers to suicide.  Negative for obsessions, compulsions or psychosis.     Fund of Knowledge Intact   Insight Intact   Judgement Intact   Attention Span & Concentration Intact   Recent & Remote Memory Intact   Gait Normal   Muscle Tone Intact        Labs     Labs reviewed.  No results found for this or any previous visit (from the past 24 hour(s)).     Impression     This is a 53 year old female with no previous psychiatric history. She was initially brought to [facility] with her  for evaluation of anxiety. Since being admitted to [facility], the patient has been cooperative, medication compliant, pleasant with her peers, respectful to staff, yet continues to be anxious. While discussing with Dr. Artis this morning, the patient appeared less anxious in comparison to yesterday, however does present to be depressed. She denied any complications or side effects from the start of Remeron 7.5mg and Seroquel 25mg. She did receive more sleep last night, however woke up around 4:00am in need of a Seroquel PRN. Dr. Artis would like to increase both Seroquel and Remeron doses today in order to aid in patient's sleep. Patient is in agreement with this adjustment. In addition to this, patient's after discharge care was considered. Patient has expressed motivation and a desire to care for her mental health after hospitalization. [facility] intensive outpatient program was explored. Patient would like to undergo intake there and [facility]      Diagnoses     1. Major Depression Disorder, without psychotic  features  2. Generalized Anxiety Disorder      Plan     1. Explained side effects, benefits, and complications of medications to the patient, Pt gave verbal consent.  2. Medication changes: Increased Seroquel to 100mg at bedtime, increased Remeron to 15mg   3. Discussed treatment plan with patient and team.  4. Projected length of stay: 7+ days  5. Reviewed [facility] outpatient program   6. Patient will need to consider FFMLA      Attestation:   Patient has been seen and evaluated by me, Osei Artis MD.    Patient ID:  Name: Celia Zhong    MRN: [id number]  Admission: 4/10/2019   YOB: 1965

## 2019-04-13 PROCEDURE — 25000132 ZZH RX MED GY IP 250 OP 250 PS 637: Performed by: PSYCHIATRY & NEUROLOGY

## 2019-04-13 PROCEDURE — 12400000 ZZH R&B MH

## 2019-04-13 PROCEDURE — 90853 GROUP PSYCHOTHERAPY: CPT

## 2019-04-13 RX ADMIN — QUETIAPINE FUMARATE 100 MG: 100 TABLET ORAL at 22:11

## 2019-04-13 RX ADMIN — QUETIAPINE 12.5 MG: 25 TABLET, FILM COATED ORAL at 08:26

## 2019-04-13 RX ADMIN — PROPRANOLOL HYDROCHLORIDE 10 MG: 10 TABLET ORAL at 21:19

## 2019-04-13 RX ADMIN — MIRTAZAPINE 15 MG: 15 TABLET, FILM COATED ORAL at 22:11

## 2019-04-13 RX ADMIN — PROPRANOLOL HYDROCHLORIDE 10 MG: 10 TABLET ORAL at 14:46

## 2019-04-13 RX ADMIN — PROPRANOLOL HYDROCHLORIDE 10 MG: 10 TABLET ORAL at 08:11

## 2019-04-13 RX ADMIN — Medication 1 LOZENGE: at 12:27

## 2019-04-13 RX ADMIN — QUETIAPINE 25 MG: 25 TABLET, FILM COATED ORAL at 11:24

## 2019-04-13 RX ADMIN — METHYLCELLULOSE 1000 MG: 500 TABLET ORAL at 22:11

## 2019-04-13 RX ADMIN — QUETIAPINE 25 MG: 25 TABLET, FILM COATED ORAL at 14:48

## 2019-04-13 ASSESSMENT — ACTIVITIES OF DAILY LIVING (ADL)
HYGIENE/GROOMING: INDEPENDENT
DRESS: STREET CLOTHES
ORAL_HYGIENE: INDEPENDENT

## 2019-04-14 PROCEDURE — 25000132 ZZH RX MED GY IP 250 OP 250 PS 637: Performed by: PSYCHIATRY & NEUROLOGY

## 2019-04-14 PROCEDURE — 90853 GROUP PSYCHOTHERAPY: CPT

## 2019-04-14 PROCEDURE — 12400000 ZZH R&B MH

## 2019-04-14 RX ADMIN — QUETIAPINE 25 MG: 25 TABLET, FILM COATED ORAL at 11:28

## 2019-04-14 RX ADMIN — METHYLCELLULOSE 1000 MG: 500 TABLET ORAL at 08:00

## 2019-04-14 RX ADMIN — QUETIAPINE 25 MG: 25 TABLET, FILM COATED ORAL at 15:55

## 2019-04-14 RX ADMIN — PROPRANOLOL HYDROCHLORIDE 10 MG: 10 TABLET ORAL at 15:55

## 2019-04-14 RX ADMIN — PROPRANOLOL HYDROCHLORIDE 10 MG: 10 TABLET ORAL at 07:59

## 2019-04-14 RX ADMIN — QUETIAPINE 25 MG: 25 TABLET, FILM COATED ORAL at 18:28

## 2019-04-14 RX ADMIN — MIRTAZAPINE 15 MG: 15 TABLET, FILM COATED ORAL at 20:52

## 2019-04-14 RX ADMIN — QUETIAPINE 25 MG: 25 TABLET, FILM COATED ORAL at 08:01

## 2019-04-14 RX ADMIN — QUETIAPINE FUMARATE 100 MG: 100 TABLET ORAL at 20:52

## 2019-04-14 RX ADMIN — Medication 1 SPRAY: at 21:35

## 2019-04-14 ASSESSMENT — ACTIVITIES OF DAILY LIVING (ADL)
HYGIENE/GROOMING: INDEPENDENT
ORAL_HYGIENE: INDEPENDENT
DRESS: STREET CLOTHES

## 2019-04-15 PROCEDURE — 90853 GROUP PSYCHOTHERAPY: CPT

## 2019-04-15 PROCEDURE — 12400000 ZZH R&B MH

## 2019-04-15 PROCEDURE — 25000132 ZZH RX MED GY IP 250 OP 250 PS 637: Performed by: PSYCHIATRY & NEUROLOGY

## 2019-04-15 RX ORDER — SENNOSIDES 8.6 MG
8.6 TABLET ORAL 2 TIMES DAILY PRN
Status: DISCONTINUED | OUTPATIENT
Start: 2019-04-15 | End: 2019-04-16 | Stop reason: HOSPADM

## 2019-04-15 RX ORDER — QUETIAPINE FUMARATE 100 MG/1
100 TABLET, FILM COATED ORAL AT BEDTIME
Status: DISCONTINUED | OUTPATIENT
Start: 2019-04-15 | End: 2019-04-16 | Stop reason: HOSPADM

## 2019-04-15 RX ORDER — QUETIAPINE FUMARATE 50 MG/1
50 TABLET, FILM COATED ORAL 2 TIMES DAILY
Status: DISCONTINUED | OUTPATIENT
Start: 2019-04-15 | End: 2019-04-16 | Stop reason: HOSPADM

## 2019-04-15 RX ORDER — MIRTAZAPINE 30 MG/1
30 TABLET, FILM COATED ORAL AT BEDTIME
Status: DISCONTINUED | OUTPATIENT
Start: 2019-04-15 | End: 2019-04-16 | Stop reason: HOSPADM

## 2019-04-15 RX ADMIN — MAGNESIUM HYDROXIDE 30 ML: 400 SUSPENSION ORAL at 16:25

## 2019-04-15 RX ADMIN — PROPRANOLOL HYDROCHLORIDE 10 MG: 10 TABLET ORAL at 21:49

## 2019-04-15 RX ADMIN — QUETIAPINE FUMARATE 50 MG: 50 TABLET ORAL at 15:52

## 2019-04-15 RX ADMIN — QUETIAPINE FUMARATE 100 MG: 100 TABLET ORAL at 21:05

## 2019-04-15 RX ADMIN — MIRTAZAPINE 30 MG: 30 TABLET ORAL at 21:05

## 2019-04-15 RX ADMIN — PROPRANOLOL HYDROCHLORIDE 10 MG: 10 TABLET ORAL at 08:14

## 2019-04-15 RX ADMIN — SENNOSIDES 8.6 MG: 8.6 TABLET, FILM COATED ORAL at 21:49

## 2019-04-15 RX ADMIN — QUETIAPINE 12.5 MG: 25 TABLET, FILM COATED ORAL at 14:23

## 2019-04-15 RX ADMIN — QUETIAPINE FUMARATE 50 MG: 50 TABLET ORAL at 08:14

## 2019-04-15 ASSESSMENT — ACTIVITIES OF DAILY LIVING (ADL)
DRESS: STREET CLOTHES
ORAL_HYGIENE: INDEPENDENT
LAUNDRY: WITH SUPERVISION
HYGIENE/GROOMING: INDEPENDENT

## 2019-04-15 NOTE — PROGRESS NOTES
"[facility] Psychiatric Progress Note       Interim History     The patient's care was discussed with the treatment team and chart notes were reviewed. Per attending nursing and PA staff members, the patient has endorsed symptoms of anxiety and concerns about these feelings. She was able to utilize her Seroquel and Propanolol PRN's, which have aided in her anxiety. Pt seen on 4/15/19 by Dr. Artis. On interview, patient states she is doing well this morning, however still feels somewhat anxious. She reports she wants to go home more than anything. When Dr. Artis asks more about patient's current medications, patient declares, \"all the medications help...I'm calm, but yet I'm still nervous.\" She denies any side effects or complications with these medications. Dr. Artis would like to increase Remeron dose to 30mg and Seroquel to 50mg BID in order to aid in patient's anxiety and sleep. Patient is in agreement with this medication adjustment, however is extremely     Hospital Course   This is a 53 year old female with no previous psychiatric history. She was initially brought to [facility] with her  for evaluation of anxiety. The patient has been experiencing worsening anxiety the past few months and having difficulties in sleep and finding the right psychiatric medications. Patient has not been suicidal, however is becoming increasingly frustrated with her situation. She has missed 15+ days of work, has lost 25 pounds, and has been suffering from constipation. Patient believes something is physically wrong with her, which has affected her quality of life. She was deemed appropriate for inpatient care in order to address this persistent anxiety. When discussing with Dr. Artis this afternoon, the patient denied ever attaining symptoms of anxiety up until 2 months ago. She has pressing concerns about this anxiety and how it has effected her physical health. Patient does " endorse symptoms of generalized anxiety that include restlessness, feeling on edge, and severe sleep disturbance. Dr. Artis has ordered the patient Seroquel 12.5-25mg every 2 hrs PRN and Remeron 7.5mg at bedtime all in hopes to aid in patient's persistent severe anxiety. In addition to this, [facility] outpatient program was discussed in detail. Patient would like to consider this plan due to the location being farther away from her home in [facility]. While discussing with Dr. Artis on 4/12, the patient appeared less anxious in comparison to yesterday, however does present to be depressed still. She denied any complications or side effects from the start of Remeron 7.5mg and Seroquel 25mg. Dr. Artis increased both Seroquel and Remeron doses at that time in order to aid in patient's sleep and anxiousness. Patient had expressed motivation and a desire to care for her mental health after hospitalization. [facility] outpatient program was explored. Patient would like to undergo intake [facility]        Medications     Current Facility-Administered Medications Ordered in Epic   Medication Dose Route Frequency Last Rate Last Dose     acetaminophen (TYLENOL) tablet 650 mg  650 mg Oral Q4H PRN         benzocaine-menthol (CHLORASEPTIC) 6-10 MG lozenge 1 lozenge  1 lozenge Buccal Q1H PRN   1 lozenge at 04/13/19 1227     famotidine (PEPCID) tablet 40 mg  40 mg Oral Daily PRN         methylcellulose (CITRUCEL) tablet 1,000 mg  1,000 mg Oral Daily PRN   1,000 mg at 04/14/19 0800     mirtazapine (REMERON) tablet 15 mg  15 mg Oral At Bedtime   15 mg at 04/14/19 2052     propranolol (INDERAL) tablet 10 mg  10 mg Oral TID PRN   10 mg at 04/14/19 1555     QUEtiapine (SEROquel) half-tab 12.5-25 mg  12.5-25 mg Oral Q2H PRN   25 mg at 04/14/19 1828     QUEtiapine (SEROquel) tablet 100 mg  100 mg Oral At Bedtime   100 mg at 04/14/19 2052     sodium  "chloride (OCEAN) 0.65 % nasal spray 1 spray  1 spray Nasal Q1H PRN   1 spray at 04/14/19 2134     No current Epic-ordered outpatient medications on file.         Allergies      No Known Allergies     Medical Review of Systems     BP (!) 134/97   Pulse 81   Temp 97.8  F (36.6  C)   Resp 16   Ht 1.6 m (5' 3\")   Wt 67 kg (147 lb 11.3 oz)   SpO2 98%   BMI 26.17 kg/m    Body mass index is 26.17 kg/m .  A 10-point review of systems was performed by Osei Artis MD and is negative, no new findings.      Psychiatric Examination     Appearance Sitting in chair, dressed in normal clothing. Appears stated age.   Attitude Cooperative   Orientation Oriented to person, place, time   Eye Contact Good    Speech Regular rate, rhythm, volume and tone   Language Normal   Psychomotor Behavior Normal   Mood Anxious and depressed    Affect Pleasant, anxious    Thought Process Goal-Oriented, Intact   Associations Intact   Thought Content Patient is currently negative for suicidal ideation, negative for plan or intent, able to contract no self harm and identify barriers to suicide.  Negative for obsessions, compulsions or psychosis.     Fund of Knowledge Intact   Insight Intact   Judgement Intact   Attention Span & Concentration Intact   Recent & Remote Memory Intact   Gait Normal   Muscle Tone Intact        Labs     Labs reviewed.  No results found for this or any previous visit (from the past 24 hour(s)).     Impression     This is a 53 year old female with no previous psychiatric history. She was initially brought to [facility] with her  for evaluation of anxiety. Over the weekend, the patient was found to be pleasant, cooperative in care, however has been fixated on her anxiety. While meeting with Dr. Artis this morning, the patient confirmed feelings of anxiousness throughout the daytime. Dr. Artis has increased Remeron dose to 15mg and Seroquel to 50mg BID and 100mg at bedtime. Patient is in " agreement with this adjustment. In addition to this, Dr. Artis would like to schedule a family meeting for tomorrow at 9:00am where the patient's  will be in attendance.      Diagnoses     1. Major Depression Disorder, without psychotic features  2. Generalized Anxiety Disorder      Plan     1. Explained side effects, benefits, and complications of medications to the patient, Pt gave verbal consent.  2. Medication changes: Increased Remeron to 30mg at bedtime, and increased Seroquel 50mg BID  3. Discussed treatment plan with patient and team.  4. Projected length of stay: 7+ days  5. Reviewed [facility] outpatient program   6. Patient will need to consider FFMLA  7. Plan for family meeting tomorrow at 9:00am       Attestation:   Patient has been seen and evaluated by me, Osei Artis MD.    Patient ID:  Name: Celia Zhong    MRN: [id number]  Admission: 4/10/2019   YOB: 1965

## 2019-04-16 VITALS
BODY MASS INDEX: 26.35 KG/M2 | RESPIRATION RATE: 14 BRPM | OXYGEN SATURATION: 98 % | SYSTOLIC BLOOD PRESSURE: 140 MMHG | DIASTOLIC BLOOD PRESSURE: 98 MMHG | HEART RATE: 107 BPM | HEIGHT: 63 IN | TEMPERATURE: 98.6 F | WEIGHT: 148.7 LBS

## 2019-04-16 PROCEDURE — 25000132 ZZH RX MED GY IP 250 OP 250 PS 637: Performed by: PSYCHIATRY & NEUROLOGY

## 2019-04-16 PROCEDURE — 90846 FAMILY PSYTX W/O PT 50 MIN: CPT

## 2019-04-16 RX ORDER — QUETIAPINE FUMARATE 100 MG/1
100 TABLET, FILM COATED ORAL AT BEDTIME
Qty: 30 TABLET | Refills: 0 | Status: ON HOLD | OUTPATIENT
Start: 2019-04-16 | End: 2019-04-22

## 2019-04-16 RX ORDER — FAMOTIDINE 40 MG/1
40 TABLET, FILM COATED ORAL DAILY PRN
Qty: 30 TABLET | Refills: 0 | Status: SHIPPED | OUTPATIENT
Start: 2019-04-16

## 2019-04-16 RX ORDER — QUETIAPINE FUMARATE 50 MG/1
50 TABLET, FILM COATED ORAL 2 TIMES DAILY
Qty: 60 TABLET | Refills: 0 | Status: ON HOLD | OUTPATIENT
Start: 2019-04-16 | End: 2019-04-22

## 2019-04-16 RX ORDER — MIRTAZAPINE 30 MG/1
30 TABLET, FILM COATED ORAL AT BEDTIME
Qty: 30 TABLET | Refills: 0 | Status: SHIPPED | OUTPATIENT
Start: 2019-04-16 | End: 2019-05-16

## 2019-04-16 RX ORDER — PROPRANOLOL HYDROCHLORIDE 10 MG/1
10 TABLET ORAL 3 TIMES DAILY PRN
Qty: 90 TABLET | Refills: 0 | Status: SHIPPED | OUTPATIENT
Start: 2019-04-16

## 2019-04-16 RX ADMIN — PROPRANOLOL HYDROCHLORIDE 10 MG: 10 TABLET ORAL at 07:51

## 2019-04-16 RX ADMIN — QUETIAPINE FUMARATE 50 MG: 50 TABLET ORAL at 07:51

## 2019-04-16 RX ADMIN — SENNOSIDES 8.6 MG: 8.6 TABLET, FILM COATED ORAL at 07:55

## 2019-04-16 ASSESSMENT — ACTIVITIES OF DAILY LIVING (ADL)
HYGIENE/GROOMING: INDEPENDENT
ORAL_HYGIENE: INDEPENDENT
DRESS: STREET CLOTHES
LAUNDRY: WITH SUPERVISION

## 2019-04-16 ASSESSMENT — MIFFLIN-ST. JEOR: SCORE: 1238.63

## 2019-04-16 NOTE — DISCHARGE SUMMARY
[facility] Psychiatric Discharge Summary      DATE OF ADMISSION: 4/10/2019     DATE OF DISCHARGE: 4/16/19    PRIMARY CARE PHYSICIAN: Nicho Mata    IDENTIFICATION:  For history, see dictation by Dr. Artis on 4/11/19. For physical summary, see dictation by Zachariah Amezquita PA-C on 4/11/19.     HOSPITAL COURSE:   This is a pleasant 53 year old female with no previous psychiatric history. She was initially brought to [facility] with her  for evaluation of anxiety. The patient had been experiencing worsening anxiety the past few months with difficulties in sleep and finding the right psychiatric medications. Patient had not been suicidal, however had been becoming increasingly frustrated with her situation. She had missed 15+ days of work, lost 25 pounds, and had been suffering from constipation. Patient believed something was physically wrong with her, which had affected her quality of life. She was deemed appropriate for inpatient care in order to address her persistent anxiety. When initially discussing with Dr. Artis, the patient denied ever attaining symptoms of anxiety up until 2 months ago. She had pressing concerns about this anxiety and how it has effected her physical health. Patient did endorse symptoms of generalized anxiety that include restlessness, feeling on edge, and severe sleep disturbance. Dr. Artis had ordered the patient Seroquel 12.5-25mg every 2 hrs PRN and Remeron 7.5mg at bedtime all in hopes to aid in patient's persistent severe anxiety. While discussing with Dr. Artis on 4/12, the patient appeared less anxious in comparison to yesterday, however did still present to be depressed. She denied any complications or side effects from the start of Remeron 7.5mg and Seroquel 25mg, thus Dr. Artis increased both Seroquel and Remeron doses in order to aid in patient's sleep and anxiousness. Patient had expressed motivation and a desire to care  "for her mental health after hospitalization, thus the [facility] intensive outpatient program was explored in detail. Patient expressed her desire to undergo intake there. FFMLA was also discussed in so that patient may focus solely on this program. Over the weekend (4/13-4/14), the patient was found to be pleasant, cooperative in care, however was fixated on her anxiety. On 4/15, patient confirmed feelings of anxiousness throughout the daytime. Dr. Artis then increased Remeron dose to 15mg and Seroquel to 50mg BID and 100mg at bedtime. A family meeting took place on 4/16 where the patient's  was in attendance. From this meeting, the patient's hospital course, progress, medications, and aftercare plans were discussed in significant detail.     DISCHARGE MENTAL STATUS EXAMINATION:  Appearance Sitting in chair, dressed in normal clothing. Appears stated age.   Attitude Cooperative   Orientation Oriented to person, place, time   Eye Contact Good    Speech Regular rate, rhythm, volume and tone   Language Normal   Psychomotor Behavior Normal   Mood Anxious    Affect Pleasant, but anxious    Thought Process Goal-Oriented, Intact   Associations Intact   Thought Content Patient is currently negative for suicidal ideation, negative for plan or intent, able to contract no self harm and identify barriers to suicide.  Negative for obsessions, compulsions or psychosis.     Fund of Knowledge Intact   Insight Intact   Judgement Intact   Attention Span & Concentration Intact   Recent & Remote Memory Intact   Gait Normal   Muscle Tone Intact         LABORATORY DATA:    Refer to hospitalist admission dictation.  No results found for this or any previous visit (from the past 24 hour(s)).     BP (!) 140/98   Pulse 107   Temp 98.6  F (37  C) (Oral)   Resp 14   Ht 1.6 m (5' 3\")   Wt 67.4 kg (148 lb 11.2 oz)   SpO2 98%   BMI 26.34 kg/m       DISCHARGE MEDICATIONS:      Review of your medicines      START " taking      Dose / Directions   mirtazapine 30 MG tablet  Commonly known as:  REMERON      Dose:  30 mg  Take 1 tablet (30 mg) by mouth At Bedtime  Quantity:  30 tablet  Refills:  0        CONTINUE these medicines which may have CHANGED, or have new prescriptions. If we are uncertain of the size of tablets/capsules you have at home, strength may be listed as something that might have changed.      Dose / Directions   famotidine 40 MG tablet  Commonly known as:  PEPCID  This may have changed:      when to take this    reasons to take this      Dose:  40 mg  Take 1 tablet (40 mg) by mouth daily as needed  Quantity:  30 tablet  Refills:  0     propranolol 10 MG tablet  Commonly known as:  INDERAL  This may have changed:  reasons to take this      Dose:  10 mg  Take 1 tablet (10 mg) by mouth 3 times daily as needed (anxiety)  Quantity:  90 tablet  Refills:  0     * QUEtiapine 100 MG tablet  Commonly known as:  SEROquel  This may have changed:  You were already taking a medication with the same name, and this prescription was added. Make sure you understand how and when to take each.      Dose:  100 mg  Take 1 tablet (100 mg) by mouth At Bedtime  Quantity:  30 tablet  Refills:  0     * QUEtiapine 50 MG tablet  Commonly known as:  SEROquel  This may have changed:      how much to take    when to take this      Dose:  50 mg  Take 1 tablet (50 mg) by mouth 2 times daily  Quantity:  60 tablet  Refills:  0         * This list has 2 medication(s) that are the same as other medications prescribed for you. Read the directions carefully, and ask your doctor or other care provider to review them with you.            CONTINUE these medicines which have NOT CHANGED      Dose / Directions   methylcellulose 500 MG Tabs tablet  Commonly known as:  CITRUCEL      Dose:  1000 mg  Take 1,000 mg by mouth daily as needed  Refills:  0        STOP taking    busPIRone 10 MG tablet  Commonly known as:  BUSPAR              Where to get your  medicines      These medications were sent to [facility] - [place], [place] - [address]  [address], [facility] [address]    Phone:  [phone]     famotidine 40 MG tablet    mirtazapine 30 MG tablet    propranolol 10 MG tablet    QUEtiapine 100 MG tablet    QUEtiapine 50 MG tablet         DISCHARGE DIAGNOSES:  1. Major depression, recurrent, severe, without psychotic features.  2. Generalized Anxiety Disorder     DISCHARGE PLAN:   1. Continue with current medications: Seroquel 25mg BID, Seroquel 150mg at bedtime, Remeron 30mg at bedtime  2. Patient will be scheduled for intake on 4/18/19 for the [facility] Intensive Outpatient Program in [place], [place]  3. Patient will follow-up with Dr. Artis on 4/22/19 at [facility] in [place], [place]    DISCHARGE FOLLOW-UP:  See Reconciliation Note    Attestation:   Patient has been seen and evaluated by me, Osei Artis MD.    Patient ID:    Name: Celia Zhong MRN: [id number]  Admission: 4/10/2019  YOB: 1965

## 2019-04-16 NOTE — PROGRESS NOTES
Meeting held with patient, her , Dr. Artis and donte.Patient reports sleeping better and feeling some relief. She talked about 2 2 1/2 year period at her job where another employee criticized and picked at her and then a different employee repeated to her all the negative things said to others about her. She found this very stressful. She said the situation changed when employee morale generally became so low that the business owner steeped in and set ground rules about interactions, which, apparently, are now being followed.   Patient feels she has benefitted from being on the unit. She is willing to go to [facility] at [facility] and to follow up with Dr. Artis  is supportive. Patient is to discharge today.

## 2019-04-16 NOTE — PROGRESS NOTES
"[facility] Psychiatric Progress Note       Interim History     The patient's care was discussed with the treatment team and chart notes were reviewed. Pt seen on 4/16/19 by Dr. Artis briefly prior to family meeting. On interview, the patient reports she is, \"still lost and everything...I don't want to stay here, but I don't think I can go home.\"   A family meeting is taking place where the patient's  is in attendance. Patient's  believes patient is still pretty anxious and that his main concern is that patient finds someone to talk to or find a hobby. When patient is brought into meeting, she is able to review her previous traumatic experiences, such as being somewhat bullied at her job both other female workers. Patient notes this has caused herself to look down on herself in some way, which has lead to anxiety. Her hospital course is also discussed with everyone. Patient does declare she has been able to attain better sleep in comparison to admission, which has helped immensely, however her anxiety is still pretty prevalent. Patient reports she would like to be more occupied with things to do after hospitalization. Dr. Artis again reminds patient that the [facility] would be a great fit for patient. After much consideration, the patient agrees to undergo intake there. Patient's  appears to be grateful for this and is looking forward the patient to attend this program.      Hospital Course   This is a 53 year old female with no previous psychiatric history. She was initially brought to [facility] with her  for evaluation of anxiety. The patient has been experiencing worsening anxiety the past few months and having difficulties in sleep and finding the right psychiatric medications. Patient has not been suicidal, however is becoming increasingly frustrated with her situation. She has missed 15+ days of work, has lost 25 pounds, and has " been suffering from constipation. Patient believes something is physically wrong with her, which has affected her quality of life. She was deemed appropriate for inpatient care in order to address this persistent anxiety. When discussing with Dr. Artis this afternoon, the patient denied ever attaining symptoms of anxiety up until 2 months ago. She has pressing concerns about this anxiety and how it has effected her physical health. Patient does endorse symptoms of generalized anxiety that include restlessness, feeling on edge, and severe sleep disturbance. Dr. Artis has ordered the patient Seroquel 12.5-25mg every 2 hrs PRN and Remeron 7.5mg at bedtime all in hopes to aid in patient's persistent severe anxiety. In addition to this, [facility] intensive outpatient program was discussed in detail. Patient would like to consider this plan due to the location being farther away from her home in [place]. While discussing with Dr. Artis on 4/12, the patient appeared less anxious in comparison to yesterday, however does present to be depressed still. She denied any complications or side effects from the start of Remeron 7.5mg and Seroquel 25mg. Dr. Artis increased both Seroquel and Remeron doses at that time in order to aid in patient's sleep and anxiousness. Patient had expressed motivation and a desire to care for her mental health after hospitalization. [facility] outpatient program was explored. Patient would like to undergo intake there and will need to look into FFA. Over the weekend (4/13-4/14), the patient was found to be pleasant, cooperative in care, however was fixated on her anxiety. On 4/15, patient confirmed feelings of anxiousness throughout the daytime. Dr. Artis then increased Remeron dose to 15mg and Seroquel to 50mg BID and 100mg at bedtime. A family meeting took place on 4/16 where the patient's  was in attendance.      Medications  "    Current Facility-Administered Medications Ordered in Epic   Medication Dose Route Frequency Last Rate Last Dose     acetaminophen (TYLENOL) tablet 650 mg  650 mg Oral Q4H PRN         benzocaine-menthol (CHLORASEPTIC) 6-10 MG lozenge 1 lozenge  1 lozenge Buccal Q1H PRN   1 lozenge at 04/13/19 1227     famotidine (PEPCID) tablet 40 mg  40 mg Oral Daily PRN         magnesium hydroxide (MILK OF MAGNESIA) suspension 30 mL  30 mL Oral Daily PRN   30 mL at 04/15/19 1625     methylcellulose (CITRUCEL) tablet 1,000 mg  1,000 mg Oral Daily PRN   1,000 mg at 04/14/19 0800     mirtazapine (REMERON) tablet 30 mg  30 mg Oral At Bedtime   30 mg at 04/15/19 2105     propranolol (INDERAL) tablet 10 mg  10 mg Oral TID PRN   10 mg at 04/15/19 2149     QUEtiapine (SEROquel) half-tab 12.5-25 mg  12.5-25 mg Oral Q2H PRN   12.5 mg at 04/15/19 1423     QUEtiapine (SEROquel) tablet 100 mg  100 mg Oral At Bedtime   100 mg at 04/15/19 2105     QUEtiapine (SEROquel) tablet 50 mg  50 mg Oral BID   50 mg at 04/15/19 1552     sennosides (SENOKOT) tablet 8.6 mg  8.6 mg Oral BID PRN   8.6 mg at 04/15/19 2149     sodium chloride (OCEAN) 0.65 % nasal spray 1 spray  1 spray Nasal Q1H PRN   1 spray at 04/14/19 2135     No current [facility]-ordered outpatient medications on file.         Allergies      No Known Allergies     Medical Review of Systems     BP (!) 142/96   Pulse 107   Temp 98.4  F (36.9  C) (Oral)   Resp 16   Ht 1.6 m (5' 3\")   Wt 67 kg (147 lb 11.3 oz)   SpO2 98%   BMI 26.17 kg/m    Body mass index is 26.17 kg/m .  A 10-point review of systems was performed by Osei Artis MD and is negative, no new findings.      Psychiatric Examination     Appearance Sitting in chair, dressed in normal clothing. Appears stated age.   Attitude Cooperative   Orientation Oriented to person, place, time   Eye Contact Good    Speech Regular rate, rhythm, volume and tone   Language Normal   Psychomotor Behavior Normal   Mood Anxious and " depressed    Affect Pleasant, anxious    Thought Process Goal-Oriented, Intact   Associations Intact   Thought Content Patient is currently negative for suicidal ideation, negative for plan or intent, able to contract no self harm and identify barriers to suicide.  Negative for obsessions, compulsions or psychosis.     Fund of Knowledge Intact   Insight Intact   Judgement Intact   Attention Span & Concentration Intact   Recent & Remote Memory Intact   Gait Normal   Muscle Tone Intact        Labs     Labs reviewed.  No results found for this or any previous visit (from the past 24 hour(s)).     Impression     This is a 53 year old female with no previous psychiatric history. She was initially brought to [facility] with her  for evaluation of anxiety. A family meeting took place this morning, where the patient's  was in attendance. During this meeting, the patient's hospital course, medications, and aftercare plans were discussed in significant detail. It was agreed upon that patient undergo intake at the [facility] program in [place], [place] after hospitalization.       Diagnoses     1. Major Depression Disorder, without psychotic features  2. Generalized Anxiety Disorder      Plan     1. Explained side effects, benefits, and complications of medications to the patient, Pt gave verbal consent.  2. Medication changes: Changed how Seroquel medication should be taken: 25mg BID and 150mg at bedtime   3. Discussed treatment plan with patient and team.  4. Projected length of stay: discharge today  5. Reviewed [facility] program   6. Patient will need to consider FFA      Attestation:   Patient has been seen and evaluated by me, Osei Artis MD.    Patient ID:  Name: Celia Zhong    MRN: [id number]  Admission: 4/10/2019   YOB: 1965

## 2019-04-16 NOTE — DISCHARGE INSTRUCTIONS
Behavioral Discharge Planning and Instructions    Summary: Admitted to hospital with acute anxiety and panic.    Main Diagnosis: Generalized anxiety disorder     Major Treatments, Procedures and Findings: psychiatric assessment; medication adjustment    Symptoms to Report: losing more sleep or mood getting worse    Lifestyle Adjustment: Follow up with outpatient program and medication management. Note what you enjoy and what interests you.    Psychiatry Follow-up:     Dr. Artis - appointment on  Monday 4/22/19 @ 2 pm.    [facility]  [address], [address]  [place]  [address]  Phone:  [phone]  Fax:  [phone]    [facility]- intake on Thursday 4/18/19 @ 1:30 pm with Susu. Come at 1 pm for paperwork.  [facility]  [address], [address]  [place]  [address]  Phone:  [phone]  Fax:  [phone]    Resources:     [facility] Crisis -   Crisis Intervention: 188.242.2591 or 273-706-7512 (TTY: 171.770.6695).  Call anytime for help.  National [place] on Mental Illness (www.[place].clara.org): 827.399.2871 or 536-344-4702.  Alcoholics Anonymous (www.alcoholics-anonymous.org): Check your phone book for your local chapter.  National Suicide Prevention Line (www.mentalhealthmn.org): 044-556-GEYN (8472)    General Medication Instructions:   See your medication sheet(s) for instructions.   Take all medicines as directed.  Make no changes unless your doctor suggests them.   Go to all your doctor visits.  Be sure to have all your required lab tests. This way, your medicines can be refilled on time.  Do not use any drugs not prescribed by your doctor.  Avoid alcohol.

## 2019-04-22 ENCOUNTER — HOSPITAL ENCOUNTER (INPATIENT)
Facility: CLINIC | Age: 54
LOS: 15 days | Discharge: HOME | DRG: 885 | End: 2019-05-07
Attending: PSYCHIATRY & NEUROLOGY | Admitting: PSYCHIATRY & NEUROLOGY
Payer: COMMERCIAL

## 2019-04-22 ENCOUNTER — TELEPHONE (OUTPATIENT)
Dept: BEHAVIORAL HEALTH | Facility: CLINIC | Age: 54
End: 2019-04-22
Payer: COMMERCIAL

## 2019-04-22 DIAGNOSIS — F33.2 SEVERE EPISODE OF RECURRENT MAJOR DEPRESSIVE DISORDER, WITHOUT PSYCHOTIC FEATURES (H): Primary | ICD-10-CM

## 2019-04-22 PROBLEM — F32.9 MAJOR DEPRESSION: Status: ACTIVE | Noted: 2019-04-22

## 2019-04-22 PROCEDURE — 25000132 ZZH RX MED GY IP 250 OP 250 PS 637: Performed by: PSYCHIATRY & NEUROLOGY

## 2019-04-22 PROCEDURE — 12400000 ZZH R&B MH

## 2019-04-22 RX ORDER — PROPRANOLOL HYDROCHLORIDE 10 MG/1
10 TABLET ORAL 3 TIMES DAILY PRN
Status: DISCONTINUED | OUTPATIENT
Start: 2019-04-22 | End: 2019-05-07 | Stop reason: HOSPADM

## 2019-04-22 RX ORDER — QUETIAPINE FUMARATE 25 MG/1
25 TABLET, FILM COATED ORAL 2 TIMES DAILY
Status: DISCONTINUED | OUTPATIENT
Start: 2019-04-23 | End: 2019-05-02

## 2019-04-22 RX ORDER — FAMOTIDINE 20 MG/1
40 TABLET, FILM COATED ORAL DAILY PRN
Status: DISCONTINUED | OUTPATIENT
Start: 2019-04-22 | End: 2019-05-07 | Stop reason: HOSPADM

## 2019-04-22 RX ORDER — QUETIAPINE FUMARATE 25 MG/1
25 TABLET, FILM COATED ORAL 2 TIMES DAILY
COMMUNITY

## 2019-04-22 RX ORDER — METRONIDAZOLE 10 MG/G
GEL TOPICAL DAILY
Status: DISCONTINUED | OUTPATIENT
Start: 2019-04-23 | End: 2019-05-07 | Stop reason: HOSPADM

## 2019-04-22 RX ORDER — METRONIDAZOLE 10 MG/G
GEL TOPICAL DAILY
COMMUNITY

## 2019-04-22 RX ORDER — MIRTAZAPINE 30 MG/1
30 TABLET, FILM COATED ORAL AT BEDTIME
Status: DISCONTINUED | OUTPATIENT
Start: 2019-04-22 | End: 2019-05-07 | Stop reason: HOSPADM

## 2019-04-22 RX ORDER — QUETIAPINE FUMARATE 50 MG/1
150 TABLET, FILM COATED ORAL AT BEDTIME
COMMUNITY

## 2019-04-22 RX ADMIN — QUETIAPINE FUMARATE 150 MG: 100 TABLET ORAL at 20:33

## 2019-04-22 RX ADMIN — MIRTAZAPINE 30 MG: 30 TABLET, FILM COATED ORAL at 20:33

## 2019-04-22 ASSESSMENT — MIFFLIN-ST. JEOR: SCORE: 1250.42

## 2019-04-22 ASSESSMENT — ACTIVITIES OF DAILY LIVING (ADL)
TRANSFERRING: 0-->INDEPENDENT
AMBULATION: 0-->INDEPENDENT
RETIRED_COMMUNICATION: 0-->UNDERSTANDS/COMMUNICATES WITHOUT DIFFICULTY
COGNITION: 0 - NO COGNITION ISSUES REPORTED
RETIRED_EATING: 0-->INDEPENDENT
TOILETING: 0-->INDEPENDENT
FALL_HISTORY_WITHIN_LAST_SIX_MONTHS: NO
DRESS: 0-->INDEPENDENT
SWALLOWING: 0-->SWALLOWS FOODS/LIQUIDS WITHOUT DIFFICULTY
BATHING: 0-->INDEPENDENT

## 2019-04-22 NOTE — TELEPHONE ENCOUNTER
S: Dr. Artis calling requesting direct admission of pt to [facility] from his clinic.    B: Pt needing further inpatient stabilization and ECT.    A: Voluntary.    R: Admit to [facility] under Dr. Artis. Unit notified of pending admission.

## 2019-04-23 LAB
ANION GAP SERPL CALCULATED.3IONS-SCNC: 9 MMOL/L (ref 3–14)
BUN SERPL-MCNC: 14 MG/DL (ref 7–30)
CALCIUM SERPL-MCNC: 10.2 MG/DL (ref 8.5–10.1)
CHLORIDE SERPL-SCNC: 106 MMOL/L (ref 94–109)
CO2 SERPL-SCNC: 27 MMOL/L (ref 20–32)
CREAT SERPL-MCNC: 0.83 MG/DL (ref 0.52–1.04)
GFR SERPL CREATININE-BSD FRML MDRD: 79 ML/MIN/{1.73_M2}
GLUCOSE SERPL-MCNC: 102 MG/DL (ref 70–99)
HBA1C MFR BLD: 6.1 % (ref 0–5.6)
MAGNESIUM SERPL-MCNC: 2.3 MG/DL (ref 1.6–2.3)
POTASSIUM SERPL-SCNC: 4.1 MMOL/L (ref 3.4–5.3)
SODIUM SERPL-SCNC: 142 MMOL/L (ref 133–144)

## 2019-04-23 PROCEDURE — 12400000 ZZH R&B MH

## 2019-04-23 PROCEDURE — 80048 BASIC METABOLIC PNL TOTAL CA: CPT | Performed by: PHYSICIAN ASSISTANT

## 2019-04-23 PROCEDURE — 83735 ASSAY OF MAGNESIUM: CPT | Performed by: PHYSICIAN ASSISTANT

## 2019-04-23 PROCEDURE — 36415 COLL VENOUS BLD VENIPUNCTURE: CPT | Performed by: PHYSICIAN ASSISTANT

## 2019-04-23 PROCEDURE — 25000132 ZZH RX MED GY IP 250 OP 250 PS 637: Performed by: PSYCHIATRY & NEUROLOGY

## 2019-04-23 PROCEDURE — 83036 HEMOGLOBIN GLYCOSYLATED A1C: CPT | Performed by: PHYSICIAN ASSISTANT

## 2019-04-23 PROCEDURE — 90853 GROUP PSYCHOTHERAPY: CPT

## 2019-04-23 RX ORDER — HYDROXYZINE HYDROCHLORIDE 25 MG/1
25-50 TABLET, FILM COATED ORAL EVERY 4 HOURS PRN
Status: DISCONTINUED | OUTPATIENT
Start: 2019-04-23 | End: 2019-05-07 | Stop reason: HOSPADM

## 2019-04-23 RX ORDER — ACETAMINOPHEN 325 MG/1
650 TABLET ORAL EVERY 4 HOURS PRN
Status: DISCONTINUED | OUTPATIENT
Start: 2019-04-23 | End: 2019-05-07 | Stop reason: HOSPADM

## 2019-04-23 RX ADMIN — HYDROXYZINE HYDROCHLORIDE 25 MG: 25 TABLET, FILM COATED ORAL at 18:02

## 2019-04-23 RX ADMIN — VORTIOXETINE 5 MG: 5 TABLET, FILM COATED ORAL at 08:31

## 2019-04-23 RX ADMIN — METRONIDAZOLE: 10 GEL TOPICAL at 21:22

## 2019-04-23 RX ADMIN — MIRTAZAPINE 30 MG: 30 TABLET, FILM COATED ORAL at 21:20

## 2019-04-23 RX ADMIN — PROPRANOLOL HYDROCHLORIDE 10 MG: 10 TABLET ORAL at 13:49

## 2019-04-23 RX ADMIN — QUETIAPINE 25 MG: 25 TABLET ORAL at 16:01

## 2019-04-23 RX ADMIN — METHYLCELLULOSE 1000 MG: 500 TABLET ORAL at 19:32

## 2019-04-23 RX ADMIN — QUETIAPINE 25 MG: 25 TABLET ORAL at 08:31

## 2019-04-23 RX ADMIN — QUETIAPINE FUMARATE 150 MG: 100 TABLET ORAL at 21:20

## 2019-04-23 ASSESSMENT — ACTIVITIES OF DAILY LIVING (ADL)
DRESS: STREET CLOTHES
HYGIENE/GROOMING: INDEPENDENT
ORAL_HYGIENE: INDEPENDENT

## 2019-04-23 ASSESSMENT — MIFFLIN-ST. JEOR: SCORE: 1239.99

## 2019-04-23 NOTE — PROGRESS NOTES
hospitalist consult received; pt with recent admission and complete H&P within 30d, for complete hx please refer to H&P performed by myself on 4/11/19. Pt readmitted for ECT. EKG performed 4/10 indicating NSR with nonspecific T-wave inversions/flattening in leads V1-V3. Electrolytes wnl during prior admission, will repeat with Magnesium. If wnl, may proceed with ECT. Will follow results.    Zachariah Amezquita PA-C  4/23/2019, 10:51 AM  Pager: [phone]    Results reviewed; lytes are wnl, Hgb A1c elevated at 6.1% indicating pre-DM, recommend further treatment as an outpatient. May proceed with ECT.

## 2019-04-23 NOTE — PLAN OF CARE
Pt appears anxious; relieved to have her  visit. Pt has seen ECT video, questions answered, has been cleared by hospitalist, and has signed consent for ECT to begin tomorrow morning. Pt is unsure if she wants to discharge tomorrow to complete ECT series outpatient, and stated she may need to stay here until has received the second treatment on Friday before going home.  stated he can adapt to either plan and is willing to drive her in for outpatient ECT treatments.  Pt received Citrucel prn for constipation; pt stated constipation is also a problem at home. Pt states feels anxious and like her body is tight; prn meds offered. Attended Wrap-up group. NPO after midnight.

## 2019-04-23 NOTE — PLAN OF CARE
Patient denies SI but has severe anxiety and feels that she is unable to think and her arms feel tight. Pt  states she is miserable and  not sure she can stay here for too long. Will call MD for prn Vistaril to help anxiety and to relax her muscles. Pt did some deep breathing exercises  watched ECT video. Inderal 10 mg given.

## 2019-04-23 NOTE — PROGRESS NOTES
04/23/19 1500   General Information   Date Initially Attended OT 04/23/19   Clinical Impression   Affect Flat   Orientation Oriented to person, place and time   Appearance and ADLs General cleanliness observed in most areas   Attention to Internal Stimuli No observed signs   Interaction Skills Interacts appropriately with staff;Interacts appropriately with peers   Ability to Communicate Needs Independent   Verbal Content Appropriate to topic   Ability to Maintain Boundaries Maintains appropriate physical boundaries;Maintains appropriate verbal boundaries   Participation Participates with minimal encouragement   Concentration Concentrates 5-10 minutes   Ability to Concentrate With structure   Follows and Comprehends Directions Independently follows multi-step directions   Memory Delayed and immediate recall intact   Organization Independently organizes medium tasks   Decision Making Independent   Planning and Problem Solving Needs further assessment   Ability to Apply and Learn Concepts Applies within group structure   Frustrations / Stress Tolerance Independently identifies sources of frustration/stress;Needs further assessment   Level of Insight Identifies needs with structure/support   Self Esteem Discredits self/work   Social Supports Has knowledge of support systems   General Observation/Plan   General Observations/Plan See Comments     INITIAL O.T. ASSESSMENT:      Pt transitioned well to group session, which addressed illness management through therapeutic activity.  Given visual guide, pt ID'd her main barriers to recovery (e.g. Confusion, insisting that she's OK,). Pt ID'd her biggest barrier to recovery as not understanding her illness which prevents her from moving forward towards recovery. With MIN VCs, pt ID'd potential solutions to addressing barrier and how to implement in her life. Pt will continue to benefit from OT intervention to address implementation of positive functional coping skills, role  performance, and community reintegration.      OT assessment completed by semi-structured interview and direct observation. Cited depression as the reason for current hospitalization. Goals ID'd include to better ID and express emotions, to improve time management, and to be more assertive. OT staff explained the purpose of pt being involved in current treatment plan.      Plan: Encourage ongoing attendance as able to meet self-stated goals, implement positive coping skills, engage in therapeutic activities, and provide assessment ongoing.

## 2019-04-23 NOTE — PLAN OF CARE
Patient is a direct admit from Dr Artis's office and is voluntary. Recently discharge from [facility] but decompensated after discharging. Feels everything is falling apart and hopes ECT will help. Will contract for safety and continue to monitor closely.  Nursing assessment complete including patient and medication profiles. Risk assessments completed addressing suicide,fall,skin,nutrition and safety issues. Care plan initiated. Assessments reviewed with physician and admit orders received. Video monitoring in progress, Patient Informed.   Welcome packet reviewed with patient. Information reviewed includes getting emergency help, preventing infections, understanding your care, using medication safely, reducing falls, preventing pressure ulcers, smoking cessation, powerful choices and Patients Bill of Rights. Pt. given tour of the unit and instruction on use of facility including emergency call light. Program schedule reviewed with patient. Questions regarding the unit addressed. Pt. Search completed and belongings inventoried.

## 2019-04-23 NOTE — H&P
"[facility] Psychiatric H&P Note       Initial History     The patient's care was discussed with the treatment team and chart notes were reviewed.     Patient examined for psychiatric admission.     IDENTIFICATION  Patient is a 53 year old  female with 4 adult children. She currently resides in [place], [place]. Pt sees PCP Nicho Berrios. Pt seen on 4/23/19 by Dr. Artis.     CHIEF COMPLAINT  \"I'm probably going to fall into the depression category\"    HISTORY OF PRESENT ILLNESS  Patient was presented to [facility] yesterday evening after seeing Dr. Artis at his office, [facility]. Patient was quite tearful, weepy, fearful, and appeared to be decompensating since she was discharged from [facility] on 4/16/19. Inpatient level of care was deemed most appropriate. When discussing with Dr. Artis this morning, the patient proceeds to report feeling extremely anxious and continues to complain of physical issues that go hand-in hand with her anxiety. Dr. Artis would like the patient to undergo ECT treatment in order to quickly address patients symptoms. Patient is in agreement with this plan and will she will undergo her first session tomorrow (4/23/19). Hopefully, the patient will be able to continue with these sessions as outpatient, but we will see how patient responds.     CHEMICAL DEPENDENCY HISTORY  No history of chemical dependency. Denies alcohol or illicit drug use.     PAST  PSYCHIATRIC HISTORY  Patient has one previous inpatient mental health hospitalizations, which occurred just last week (4/11-4/16) . No previous ED visits. Patient previously saw a therapist and Marychuy Burns NP for medication management however currently follow-ups with Dr. Artis at [facility]. She was supposed to be going through with an intensive outpatient program through [facility] [place].      Previous psychiatric medications include: Lexapro (made her shaky),  Atarax, " Buspar    FAMILY HISTORY  Denies any form of family history of chemical dependency or mental health.     SOCIAL HISTORY  Patient grew up in [place] with 10 other siblings. She grew up with both parents who were very supportive during her childhood. She graduated high school and then went on to attain small jobs. Patient never attended any further schooling. Patient has been  for 32 years and has 4 adult children. Patient works as an administrative clerical worker. She currently resides with her  in [place], [place].     Medications     Medications Prior to Admission   Medication Sig Dispense Refill Last Dose     cholecalciferol (VITAMIN D3) 5000 units (125 mcg) capsule Take 5,000 Units by mouth daily   4/22/2019 at Unknown time     famotidine (PEPCID) 40 MG tablet Take 1 tablet (40 mg) by mouth daily as needed 30 tablet 0 prn     methylcellulose (CITRUCEL) 500 MG TABS tablet Take 1,000 mg by mouth daily as needed   prn     metroNIDAZOLE (METROGEL) 1 % external gel Apply topically daily To face for rosacea   4/22/2019 at Unknown time     mirtazapine (REMERON) 30 MG tablet Take 1 tablet (30 mg) by mouth At Bedtime 30 tablet 0 4/21/2019 at Unknown time     propranolol (INDERAL) 10 MG tablet Take 1 tablet (10 mg) by mouth 3 times daily as needed (anxiety) 90 tablet 0 prn     QUEtiapine (SEROQUEL) 25 MG tablet Take 25 mg by mouth 2 times daily 0800 and 1600   4/22/2019 at x2     QUEtiapine (SEROQUEL) 50 MG tablet Take 150 mg by mouth At Bedtime   4/21/2019 at Unknown time     sodium chloride (OCEAN) 0.65 % nasal spray Spray 1 spray into both nostrils daily as needed for congestion   prn     UNKNOWN TO PATIENT Pt said she was supposed to start something by Dr. Chandra menendez but doesn't know what it is and was instructed not to fill it and would start in the hospital          Scheduled Medications:    cholecalciferol  5,000 Units Oral Daily     metroNIDAZOLE   Topical Daily     mirtazapine  30 mg Oral At  "Bedtime     QUEtiapine  150 mg Oral At Bedtime     QUEtiapine  25 mg Oral BID     vortioxetine  5 mg Oral Daily     PRNs:  famotidine, methylcellulose, propranolol, sodium chloride      Allergies      No Known Allergies     Previous Medical History     Past Medical History:   Diagnosis Date     Anxiety         Medical Review of Systems     BP (!) 150/93   Pulse 95   Temp 97.8  F (36.6  C) (Oral)   Resp 16   Ht 1.6 m (5' 3\")   Wt 68.6 kg (151 lb 4.8 oz)   BMI 26.80 kg/m    Body mass index is 26.8 kg/m .     Mental Status Examination     Appearance Sitting in chair, dressed in hospital scrubs. Appears stated age.   Attitude Cooperative   Orientation Oriented to person, place, time   Eye Contact Good   Speech Regular rate, rhythm, volume and tone   Language Normal   Psychomotor Behavior Normal   Mood Extremely anxious   Affect Anxious   Thought Process Goal-Oriented, Intact   Associations Intact   Thought Content Patient is currently negative for suicidal ideation, negative for plan or intent, able to contract no self harm and identify barriers to suicide.  Negative for obsessions, compulsions or psychosis.     Fund of Knowledge Intact   Insight Poor   Judgement Limited   Attention Span & Concentration Intact   Recent & Remote Memory Intact   Gait Normal   Muscle Tone Intact      Labs     Labs reviewed.  No results found for this or any previous visit (from the past 24 hour(s)).       Impression   This is a 53 year old  female who was presented to [facility] yesterday evening after seeing Dr. Artis at his office, [facility]. Patient was very weepy, fearful, and appeared to decompensate after her discharge from [facility] on 4/16/19. Dr. Artis deemed patient to be appropriate for inpatient level care. Patient is an appropriate candidate for ECT treatment. This treatment will address patients significant symptoms of anxiety and depression far more quickly in comparison to medications at this time. " Patient will undergo her first session tomorrow (4/24/19) as inpatient, however we hope that patient will be able to undergo the other 5 sessions as outpatient.      Diagnoses     1. Major depression, recurrent, severe, without psychotic features.     Plan     1. Explained side effects, benefits, and complications of medications to the patient, Pt gave verbal consent.  2. Medication changes: None  3. Discussed treatment plan with patient and team.  4. Projected length of stay: 7+ days  5. Patient will receive her first ECT session tomorrow (4/24/19) and will hopefully continue with the other 5 treatments as outpatient       Attestation:   Patient has been seen and evaluated by me, Osei Artis MD.    Patient ID:  Name: Celia Zhong MRN: [id number]  Admission: 4/22/2019 YOB: 1965

## 2019-04-23 NOTE — PROGRESS NOTES
04/22/19 2114   Patient Belongings   Patient Belongings locker;sent to security per site process   Patient Belongings Put in Hospital Secure Location (Security or Locker, etc.) clothing   Belongings Search Yes   Clothing Search Yes   Second Staff Ramon Aguiarileguille  Glasses with case  Colored pencils  Coloring book  Book  Sweater with strings  Pair of jeans x3  bra  Plaid pj pants  t-shirt x5  Leggings  Pair of socks x3  Underwear x5  Shoes with string  Purse  Loose change  listerine  chapstick x2  [place] purse  Ear phones  Pouch  Wallet  Rewards cards  Insurance cards  $49 cash  [place] enhanced ID    Security envelope #[id number]  Visa [id number]  Disc[employer] [id number]  [employer] gift card x3  Visa 5007  Visa gift card  [employer] gift card  Visa 1272  [facility]'s [id number]  REDcard [id number]  [place] gift card  [employer] [id number] x2  [employer] gift card  [employer] gift card    Admission:  I am responsible for any personal items that are not sent to the safe or pharmacy. [facility] is not responsible for loss, theft or damage of any property in my possession.        Patient Signature: ___________________________________________      Date/Time:__________________________     Staff Signature: __________________________________      Date/Time:__________________________     2nd Staff person, if patient is unable/unwilling to sign:      __________________________________________________________      Date/Time: __________________________        Discharge:  [facility] has returned all of my personal belongings:     Patient Signature: ________________________________________     Date/Time: ____________________________________     Staff Signature: ______________________________________     Date/Time:_____________________________________

## 2019-04-23 NOTE — PHARMACY-ADMISSION MEDICATION HISTORY
Admission medication history interview status for the 4/22/2019  admission is complete. See EPIC admission navigator for prior to admission medications     Medication history source reliability:Good    Actions taken by pharmacist (provider contacted, etc):None     Additional medication history information not noted on PTA med list :None    Medication reconciliation/reorder completed by provider prior to medication history? No    Time spent in this activity: 15 mins    Prior to Admission medications    Medication Sig Last Dose Taking? Auth Provider   cholecalciferol (VITAMIN D3) 5000 units (125 mcg) capsule Take 5,000 Units by mouth daily 4/22/2019 at Unknown time Yes Unknown, Entered By History   famotidine (PEPCID) 40 MG tablet Take 1 tablet (40 mg) by mouth daily as needed prn Yes Osei Artis MD   methylcellulose (CITRUCEL) 500 MG TABS tablet Take 1,000 mg by mouth daily as needed prn Yes Unknown, Entered By History   metroNIDAZOLE (METROGEL) 1 % external gel Apply topically daily To face for rosacea 4/22/2019 at Unknown time Yes Unknown, Entered By History   mirtazapine (REMERON) 30 MG tablet Take 1 tablet (30 mg) by mouth At Bedtime 4/21/2019 at Unknown time Yes Osei Artis MD   propranolol (INDERAL) 10 MG tablet Take 1 tablet (10 mg) by mouth 3 times daily as needed (anxiety) prn Yes Osei Artis MD   QUEtiapine (SEROQUEL) 25 MG tablet Take 25 mg by mouth 2 times daily 0800 and 1600 4/22/2019 at x2 Yes Unknown, Entered By History   QUEtiapine (SEROQUEL) 50 MG tablet Take 150 mg by mouth At Bedtime 4/21/2019 at Unknown time Yes Unknown, Entered By History   sodium chloride (OCEAN) 0.65 % nasal spray Spray 1 spray into both nostrils daily as needed for congestion prn Yes Unknown, Entered By History   UNKNOWN TO PATIENT Pt said she was supposed to start something by Dr. Chandra menendez but doesn't know what it is and was instructed not to fill it and would start in the hospital  Yes  Unknown, Entered By History     Iva Bernabe, PharmD

## 2019-04-24 ENCOUNTER — ANESTHESIA EVENT (OUTPATIENT)
Dept: SURGERY | Facility: CLINIC | Age: 54
End: 2019-04-24

## 2019-04-24 ENCOUNTER — ANESTHESIA (OUTPATIENT)
Dept: SURGERY | Facility: CLINIC | Age: 54
End: 2019-04-24

## 2019-04-24 LAB — HCG SERPL QL: NEGATIVE

## 2019-04-24 PROCEDURE — 25000132 ZZH RX MED GY IP 250 OP 250 PS 637: Performed by: PSYCHIATRY & NEUROLOGY

## 2019-04-24 PROCEDURE — 84703 CHORIONIC GONADOTROPIN ASSAY: CPT | Performed by: ANESTHESIOLOGY

## 2019-04-24 PROCEDURE — 25000125 ZZHC RX 250: Performed by: ANESTHESIOLOGY

## 2019-04-24 PROCEDURE — 90853 GROUP PSYCHOTHERAPY: CPT

## 2019-04-24 PROCEDURE — 36415 COLL VENOUS BLD VENIPUNCTURE: CPT | Performed by: ANESTHESIOLOGY

## 2019-04-24 PROCEDURE — 90870 ELECTROCONVULSIVE THERAPY: CPT

## 2019-04-24 PROCEDURE — 25800030 ZZH RX IP 258 OP 636: Performed by: ANESTHESIOLOGY

## 2019-04-24 PROCEDURE — 12400000 ZZH R&B MH

## 2019-04-24 PROCEDURE — 25000128 H RX IP 250 OP 636: Performed by: ANESTHESIOLOGY

## 2019-04-24 RX ORDER — SODIUM CHLORIDE, SODIUM LACTATE, POTASSIUM CHLORIDE, CALCIUM CHLORIDE 600; 310; 30; 20 MG/100ML; MG/100ML; MG/100ML; MG/100ML
500 INJECTION, SOLUTION INTRAVENOUS CONTINUOUS
Status: DISCONTINUED | OUTPATIENT
Start: 2019-04-24 | End: 2019-04-27

## 2019-04-24 RX ADMIN — MIRTAZAPINE 30 MG: 30 TABLET, FILM COATED ORAL at 20:58

## 2019-04-24 RX ADMIN — SUCCINYLCHOLINE CHLORIDE 100 MG: 20 INJECTION, SOLUTION INTRAMUSCULAR; INTRAVENOUS; PARENTERAL at 06:34

## 2019-04-24 RX ADMIN — PROPRANOLOL HYDROCHLORIDE 10 MG: 10 TABLET ORAL at 16:10

## 2019-04-24 RX ADMIN — METHOHEXITAL SODIUM 100 MG: 500 INJECTION, POWDER, LYOPHILIZED, FOR SOLUTION INTRAMUSCULAR; INTRAVENOUS; RECTAL at 06:34

## 2019-04-24 RX ADMIN — SODIUM CHLORIDE, POTASSIUM CHLORIDE, SODIUM LACTATE AND CALCIUM CHLORIDE 500 ML: 600; 310; 30; 20 INJECTION, SOLUTION INTRAVENOUS at 06:13

## 2019-04-24 RX ADMIN — LIDOCAINE HYDROCHLORIDE 1 ML: 10 INJECTION, SOLUTION EPIDURAL; INFILTRATION; INTRACAUDAL; PERINEURAL at 06:14

## 2019-04-24 RX ADMIN — METRONIDAZOLE: 10 GEL TOPICAL at 20:58

## 2019-04-24 RX ADMIN — QUETIAPINE FUMARATE 150 MG: 100 TABLET ORAL at 20:57

## 2019-04-24 RX ADMIN — ACETAMINOPHEN 650 MG: 325 TABLET, FILM COATED ORAL at 08:25

## 2019-04-24 RX ADMIN — METHYLCELLULOSE 1000 MG: 500 TABLET ORAL at 12:01

## 2019-04-24 RX ADMIN — CHOLECALCIFEROL CAP 125 MCG (5000 UNIT) 5000 UNITS: 125 CAP at 07:48

## 2019-04-24 RX ADMIN — QUETIAPINE 25 MG: 25 TABLET ORAL at 07:49

## 2019-04-24 RX ADMIN — VORTIOXETINE 5 MG: 5 TABLET, FILM COATED ORAL at 07:49

## 2019-04-24 RX ADMIN — QUETIAPINE 25 MG: 25 TABLET ORAL at 16:11

## 2019-04-24 RX ADMIN — HYDROXYZINE HYDROCHLORIDE 25 MG: 25 TABLET, FILM COATED ORAL at 20:58

## 2019-04-24 ASSESSMENT — ACTIVITIES OF DAILY LIVING (ADL)
ORAL_HYGIENE: INDEPENDENT
LAUNDRY: WITH SUPERVISION
HYGIENE/GROOMING: INDEPENDENT
DRESS: INDEPENDENT

## 2019-04-24 NOTE — PLAN OF CARE
Pt had her first ECT this morning. Pt reported that it went well. Pt has had no complaint of pain or discomfort. Pt has attended groups and social with peers and staff. Pt is smiling. Med compliant,

## 2019-04-24 NOTE — PROCEDURES
[facility] ECT Procedure Note     Celia MORGAN Stifter [id number]   53 year old 1965     Patient Status: Inpatient    No Known Allergies    Weight:  149 lbs 0 oz    Patient Preparations: (none)         Diagnosis:   Major depression       Indications for ECT:   Medications ineffective       Pause for the Cause:     Right patient Yes   Right procedure/laterality settings: Yes   Right diagnosis Yes          Intra-Procedure Documentation:     Date:  4/24/2019  Time:  6:22 AM    ECT #    Treatment number this series: 1   Total treatment number: 1   Type of ECT:  Bilateral, standard    ECT Medications administered: See MAR and Anesthesia record         Clinical Narrative:     ECT was administered by Thymatron machine.  Pt has been medically cleared for procedure, consent signed. Side effects, Risks and benefits reviewed.    ECT Strip Summary:   Energy Level: 50 percent  Motor Seizure Duration: 30 seconds  EEG Seizure Duration: 30 seconds    Complications: No    Plan: 2nd ECT 4/26/19  Outpatient Psychiatrist: Dr Artis

## 2019-04-24 NOTE — PLAN OF CARE
Pt actively participated in a mindfulness group focusing on reduction of anxiety, improvement of mood, and increase in concentration. The mindfulness practice was offered via supportive approaches including mindful movements/stretching, journaling, and progressive muscle relaxation to decrease worry, rumination, and other negative feelings. Pt was able to remain focused for the full duration.

## 2019-04-24 NOTE — ANESTHESIA PREPROCEDURE EVALUATION
Anesthesia Pre-Procedure Evaluation    Patient: Celia Zhong   MRN: [id number] : 1965          Preoperative Diagnosis: * No pre-op diagnosis entered *    * No procedures listed *    Past Medical History:   Diagnosis Date     Anxiety      Past Surgical History:   Procedure Laterality Date     TUBAL LIGATION         Anesthesia Evaluation     .             ROS/MED HX    ENT/Pulmonary:  - neg pulmonary ROS    (-) sleep apnea   Neurologic:  - neg neurologic ROS     Cardiovascular:  - neg cardiovascular ROS       METS/Exercise Tolerance:     Hematologic:  - neg hematologic  ROS       Musculoskeletal:  - neg musculoskeletal ROS       GI/Hepatic:  - neg GI/hepatic ROS       Renal/Genitourinary:  - ROS Renal section negative       Endo:  - neg endo ROS       Psychiatric:     (+) psychiatric history anxiety and depression      Infectious Disease:  - neg infectious disease ROS       Malignancy:      - no malignancy   Other:                          Physical Exam  Normal systems: cardiovascular, pulmonary and dental    Airway   Mallampati: II  TM distance: >3 FB  Neck ROM: full    Dental     Cardiovascular       Pulmonary             Lab Results   Component Value Date    WBC 7.6 04/10/2019    HGB 13.5 04/10/2019    HCT 40.2 04/10/2019     04/10/2019     2019    POTASSIUM 4.1 2019    CHLORIDE 106 2019    CO2 27 2019    BUN 14 2019    CR 0.83 2019     (H) 2019    TONY 10.2 (H) 2019    MAG 2.3 2019    TSH 1.06 04/10/2019    HCGS Negative 2019       Preop Vitals  BP Readings from Last 3 Encounters:   19 134/89   19 (!) 140/98    Pulse Readings from Last 3 Encounters:   19 88   04/15/19 107      Resp Readings from Last 3 Encounters:   19 16   19 14    SpO2 Readings from Last 3 Encounters:   19 97%   04/10/19 98%      Temp Readings from Last 1 Encounters:   19 37.1  C (98.8  F) (Temporal)    Ht Readings  "from Last 1 Encounters:   04/22/19 1.6 m (5' 3\")      Wt Readings from Last 1 Encounters:   04/23/19 67.6 kg (149 lb)    Estimated body mass index is 26.39 kg/m  as calculated from the following:    Height as of this encounter: 1.6 m (5' 3\").    Weight as of this encounter: 67.6 kg (149 lb).       Anesthesia Plan      History & Physical Review  History and physical reviewed and following examination; no interval change.    ASA Status:  2 .    NPO Status:  > 8 hours    Plan for General (with mask ) with Intravenous induction.          Postoperative Care  Postoperative pain management:  IV analgesics.      Consents  Anesthetic plan, risks, benefits and alternatives discussed with:  Patient..                 Efrain Rajput, DO, DO  "

## 2019-04-24 NOTE — PROGRESS NOTES
[facility] Psychiatric Progress Note       Interim History     The patient's care was discussed with the treatment team and chart notes were reviewed. Since admission, the patient has been anxious and complaining of physical symptoms associated with anxiety. She has reported to staff members that she is miserable and unsure if she can stay here in the hospital any longer. Patient received her first ECT session this morning where no complications arose. Pt seen on 4/24/19 by Dr. Artis. Upon interview, the patient appeared much improved after one ECT session. She reported feeling a little less anxious, however continues to complain of physical symptoms of anxiety such as muscle tightness. Patient is willing to undergo her second session on 4/26/19 as inpatient, believing she needs more time here in the hospital.       Hospital Course   This is a 53 year old  female who was presented to [facility] on 4/22 after seeing Dr. Artis at his office, [facility]. Patient was very weepy, fearful, and appeared to decompensate after her discharge from [facility] on 4/16/19. Dr. Artis deemed patient to be appropriate for inpatient level care. Patient was an appropriate candidate for ECT treatment. This treatment would be able to address patients significant symptoms of anxiety and depression far more quickly in comparison to medications at this time. Patient underwent her first session on 4/24/19 as inpatient where there were no complications.      Medications     Current Facility-Administered Medications Ordered in Epic   Medication Dose Route Frequency Last Rate Last Dose     [Auto Hold] acetaminophen (TYLENOL) tablet 650 mg  650 mg Oral Q4H PRN         [Auto Hold] cholecalciferol (VITAMIN D3) 5000 units (125 mcg) capsule 5,000 Units  5,000 Units Oral Daily         [Auto Hold] famotidine (PEPCID) tablet 40 mg  40 mg Oral Daily PRN         [Auto Hold] hydrOXYzine (ATARAX) tablet 25-50 mg   "25-50 mg Oral Q4H PRN   25 mg at 04/23/19 1802     lactated ringers infusion  500 mL Intravenous Continuous 25 mL/hr at 04/24/19 0613 500 mL at 04/24/19 0613     lidocaine 1 % 0.1-1 mL  0.1-1 mL Other Q1H PRN   1 mL at 04/24/19 0614     [Auto Hold] methylcellulose (CITRUCEL) tablet 1,000 mg  1,000 mg Oral Daily PRN   1,000 mg at 04/23/19 1932     [Auto Hold] metroNIDAZOLE (METROGEL) 1 % gel   Topical Daily         [Auto Hold] mirtazapine (REMERON) tablet 30 mg  30 mg Oral At Bedtime   30 mg at 04/23/19 2120     [Auto Hold] propranolol (INDERAL) tablet 10 mg  10 mg Oral TID PRN   10 mg at 04/23/19 1349     [Auto Hold] QUEtiapine (SEROquel) tablet 150 mg  150 mg Oral At Bedtime   150 mg at 04/23/19 2120     [Auto Hold] QUEtiapine (SEROquel) tablet 25 mg  25 mg Oral BID   25 mg at 04/23/19 1601     [Auto Hold] sodium chloride (OCEAN) 0.65 % nasal spray 1 spray  1 spray Both Nostrils Daily PRN         [Auto Hold] vortioxetine (TRINTELLIX/BRINTELLIX) tablet 5 mg  5 mg Oral Daily   5 mg at 04/23/19 0831     No current [facility]-ordered outpatient medications on file.         Allergies      No Known Allergies     Medical Review of Systems     BP (!) 140/114   Pulse 114   Temp 98.8  F (37.1  C) (Temporal)   Resp 17   Ht 1.6 m (5' 3\")   Wt 67.6 kg (149 lb)   SpO2 97%   BMI 26.39 kg/m    Body mass index is 26.39 kg/m .  A 10-point review of systems was performed by Osei Artis MD and is negative, no new findings.      Psychiatric Examination     Appearance Sitting in chair, dressed in hospital scrubs. Appears stated age.   Attitude Cooperative   Orientation Oriented to person, place, time   Eye Contact Good   Speech Regular rate, rhythm, volume and tone   Language Normal   Psychomotor Behavior Normal   Mood Less anxious   Affect Anxious   Thought Process Goal-Oriented, Intact   Associations Intact   Thought Content Patient is currently negative for suicidal ideation, negative for plan or intent, able to contract " no self harm and identify barriers to suicide.  Negative for obsessions, compulsions or psychosis.     Fund of Knowledge Intact   Insight Poor   Judgement Limited   Attention Span & Concentration Intact   Recent & Remote Memory Intact   Gait Normal   Muscle Tone Intact        Labs     Labs reviewed.  Recent Results (from the past 24 hour(s))   Basic metabolic panel    Collection Time: 04/23/19 11:32 AM   Result Value Ref Range    Sodium 142 133 - 144 mmol/L    Potassium 4.1 3.4 - 5.3 mmol/L    Chloride 106 94 - 109 mmol/L    Carbon Dioxide 27 20 - 32 mmol/L    Anion Gap 9 3 - 14 mmol/L    Glucose 102 (H) 70 - 99 mg/dL    Urea Nitrogen 14 7 - 30 mg/dL    Creatinine 0.83 0.52 - 1.04 mg/dL    GFR Estimate 79 >60 mL/min/[1.73_m2]    GFR Estimate If Black >90 >60 mL/min/[1.73_m2]    Calcium 10.2 (H) 8.5 - 10.1 mg/dL   Magnesium    Collection Time: 04/23/19 11:32 AM   Result Value Ref Range    Magnesium 2.3 1.6 - 2.3 mg/dL   Hemoglobin A1c    Collection Time: 04/23/19 11:32 AM   Result Value Ref Range    Hemoglobin A1C 6.1 (H) 0 - 5.6 %   HCG qualitative Blood    Collection Time: 04/24/19  6:05 AM   Result Value Ref Range    HCG Qualitative Serum Negative NEG^Negative        Impression   This is a 53 year old  female who was presented to [facility] after being seen by Dr. Artis at his office, [facility]. Since admission, the patient has appeared extremely anxious and has made multiple complaints in regards of the possible physical symptoms that are associated with anxiety. She underwent her first ECT session this morning, with no complications. Patient appeared to be much improved in comparison to admission. She noted feeling improvement in anxiety overall, however continued to complain of physical symptoms such as muscle tightness. Patient has expressed her desire to undergo her second ECT session on 4/26/19 as inpatient rather than outpatient.       Diagnoses     1. Major depression, recurrent, severe,  without psychotic features.     Plan     1. Explained side effects, benefits, and complications of medications to the patient, Pt gave verbal consent.  2. Medication changes: None  3. Discussed treatment plan with patient and team.  4. Projected length of stay: 7+ days  5. Patient will undergo her second ECT session on 4/26/19 as inpatient     Attestation:   Patient has been seen and evaluated by me, Osei Artis MD.    Patient ID:  Name: Celia Zhong    MRN: [id number]  Admission: 4/22/2019   YOB: 1965

## 2019-04-24 NOTE — ANESTHESIA POSTPROCEDURE EVALUATION
Patient: Celia Zhong    * No procedures listed *    Diagnosis:* No pre-op diagnosis entered *  Diagnosis Additional Information: No value filed.    Anesthesia Type:  General    Note:  Anesthesia Post Evaluation    Patient location during evaluation: bedside  Patient participation: Able to fully participate in evaluation  Level of consciousness: awake  Pain management: adequate  Airway patency: patent  Cardiovascular status: acceptable  Respiratory status: acceptable  Hydration status: acceptable  PONV: none     Anesthetic complications: None    Comments: No anesthetic complications noted.         Last vitals:  Vitals:    04/24/19 0715 04/24/19 0811 04/24/19 1559   BP: (!) 145/98 (!) 152/103 (!) 144/99   Pulse: 105 93 104   Resp: 14 16 16   Temp:  36.6  C (97.8  F) 37.2  C (98.9  F)   SpO2: 95%           Electronically Signed By: Efrain Rajput DO, DO  April 24, 2019  4:04 PM

## 2019-04-25 PROCEDURE — 25000132 ZZH RX MED GY IP 250 OP 250 PS 637: Performed by: PSYCHIATRY & NEUROLOGY

## 2019-04-25 PROCEDURE — 12400000 ZZH R&B MH

## 2019-04-25 PROCEDURE — 90853 GROUP PSYCHOTHERAPY: CPT

## 2019-04-25 RX ADMIN — VORTIOXETINE 5 MG: 5 TABLET, FILM COATED ORAL at 08:24

## 2019-04-25 RX ADMIN — QUETIAPINE 25 MG: 25 TABLET ORAL at 16:00

## 2019-04-25 RX ADMIN — QUETIAPINE 25 MG: 25 TABLET ORAL at 08:24

## 2019-04-25 RX ADMIN — METRONIDAZOLE: 10 GEL TOPICAL at 20:45

## 2019-04-25 RX ADMIN — CHOLECALCIFEROL CAP 125 MCG (5000 UNIT) 5000 UNITS: 125 CAP at 08:24

## 2019-04-25 RX ADMIN — MIRTAZAPINE 30 MG: 30 TABLET, FILM COATED ORAL at 20:42

## 2019-04-25 RX ADMIN — QUETIAPINE FUMARATE 150 MG: 100 TABLET ORAL at 20:43

## 2019-04-25 ASSESSMENT — ACTIVITIES OF DAILY LIVING (ADL)
DRESS: SCRUBS (BEHAVIORAL HEALTH);INDEPENDENT
HYGIENE/GROOMING: INDEPENDENT
LAUNDRY: WITH SUPERVISION
HYGIENE/GROOMING: INDEPENDENT
DRESS: STREET CLOTHES
LAUNDRY: WITH SUPERVISION
ORAL_HYGIENE: INDEPENDENT
ORAL_HYGIENE: INDEPENDENT

## 2019-04-25 NOTE — PLAN OF CARE
Patient doing better after ECT ,denies SI, still ruminates some but much better. Will have ECT #2 Friday and then would like to discontinue. States her  will be able to drive her .

## 2019-04-25 NOTE — PROGRESS NOTES
"BEHAVIORAL HEALTH NUTRITION ASSESSMENT      REASON FOR ASSESSMENT:  Admission screen -  New/uncontrolled diabetes    CURRENT DIET AND NOURISHMENT ORDER:  Diet: Regular    Current Intake/Tolerance:  No intake recorded on the doc flowsheet. Patient not available/not appropriate to visit with at this time. Patient ordering adequate amounts of food and balanced meals as per review of menus.      ANTHROPOMETRICS:  Height: 5' 3\"  Weight: 67.6 kg  BMI: 26.339 kg/m2  IBW: 52.3 kg +/- 10%  %IBW: 129%  Weight History:   Wt Readings from Last 10 Encounters:   04/23/19 67.6 kg (149 lb)   04/16/19 67.4 kg (148 lb 11.2 oz)       LABS:  A1C 6.1    NUTRITION STATUS VALIDATION:  Weight status: Overweight BMI 25-29.9      INTERVENTION:    Nutrition Diagnosis:  No nutrition diagnosis at this time.    Implementation:   Nutrition education:  not appropriate at this time, recommend OP referral     Follow Up/Monitoring:   No need for further follow-up unless another consult received.      Mya Thao RD  Pager [phone] (M-F)            [phone] ([facility] & Hol)        "

## 2019-04-25 NOTE — PROGRESS NOTES
[facility] Psychiatric Progress Note       Interim History     The patient's care was discussed with the treatment team and chart notes were reviewed. Per attending staff members, the patient has presented with an anxious mood. Pt seen on 4/25/19 by Dr. Artis. On interview, patient proceeds to consistently worry about the things she is unable to do while here in the hospital such as participate in groups, talk to people, etc. She proceeds to think negatively of herself such as the inability to get better. Patient will undergo her second ECT session tomorrow morning as inpatient.      Hospital Course   This is a 53 year old  female who was presented to [facility] on 4/22 after seeing Dr. Artis at his office, [facility]. Patient was very weepy, fearful, and appeared to decompensate after her discharge from [facility] on 4/16/19. Dr. Artis deemed patient to be appropriate for inpatient level care. Patient was an appropriate candidate for ECT treatment. This treatment would be able to address patients significant symptoms of anxiety and depression far more quickly in comparison to medications at this time. Patient underwent her first session on 4/24/19 as inpatient where there were no complications. Patient appeared to be much improved in comparison to admission after her first ECT session. She even noted feeling improvement in overall anxiety, however continued to complain of physical symptoms such as muscle tightness. Patient had expressed her desire to undergo her second ECT session on 4/26/19 as inpatient rather than outpatient.      Medications     Current Facility-Administered Medications Ordered in Epic   Medication Dose Route Frequency Last Rate Last Dose     acetaminophen (TYLENOL) tablet 650 mg  650 mg Oral Q4H PRN   650 mg at 04/24/19 0825     cholecalciferol (VITAMIN D3) 5000 units (125 mcg) capsule 5,000 Units  5,000 Units Oral Daily   5,000 Units at 04/25/19 0824      "famotidine (PEPCID) tablet 40 mg  40 mg Oral Daily PRN         hydrOXYzine (ATARAX) tablet 25-50 mg  25-50 mg Oral Q4H PRN   25 mg at 04/24/19 2058     lactated ringers infusion  500 mL Intravenous Continuous 25 mL/hr at 04/24/19 0613 500 mL at 04/24/19 0613     lidocaine 1 % 0.1-1 mL  0.1-1 mL Other Q1H PRN   1 mL at 04/24/19 0614     methylcellulose (CITRUCEL) tablet 1,000 mg  1,000 mg Oral Daily PRN   1,000 mg at 04/24/19 1201     metroNIDAZOLE (METROGEL) 1 % gel   Topical Daily         mirtazapine (REMERON) tablet 30 mg  30 mg Oral At Bedtime   30 mg at 04/24/19 2058     propranolol (INDERAL) tablet 10 mg  10 mg Oral TID PRN   10 mg at 04/24/19 1610     QUEtiapine (SEROquel) tablet 150 mg  150 mg Oral At Bedtime   150 mg at 04/24/19 2057     QUEtiapine (SEROquel) tablet 25 mg  25 mg Oral BID   25 mg at 04/25/19 0824     sodium chloride (OCEAN) 0.65 % nasal spray 1 spray  1 spray Both Nostrils Daily PRN         vortioxetine (TRINTELLIX/BRINTELLIX) tablet 5 mg  5 mg Oral Daily   5 mg at 04/25/19 0824     No current Epic-ordered outpatient medications on file.         Allergies      No Known Allergies     Medical Review of Systems     BP (!) 120/94   Pulse 104   Temp 97.7  F (36.5  C) (Oral)   Resp 14   Ht 1.6 m (5' 3\")   Wt 67.6 kg (149 lb)   SpO2 95%   BMI 26.39 kg/m    Body mass index is 26.39 kg/m .  A 10-point review of systems was performed by Osei Artis MD and is negative, no new findings.      Psychiatric Examination     Appearance Sitting in chair, dressed in hospital scrubs. Appears stated age.   Attitude Cooperative   Orientation Oriented to person, place, time   Eye Contact Good   Speech Regular rate, rhythm, volume and tone   Language Normal   Psychomotor Behavior Normal   Mood Anxious, hopeless    Affect Anxious   Thought Process Goal-Oriented, Intact   Associations Intact   Thought Content Patient is currently negative for suicidal ideation, negative for plan or intent, able to " contract no self harm and identify barriers to suicide.  Negative for obsessions, compulsions or psychosis.     Fund of Knowledge Intact   Insight Poor   Judgement Limited   Attention Span & Concentration Intact   Recent & Remote Memory Intact   Gait Normal   Muscle Tone Intact        Labs     Labs reviewed.  No results found for this or any previous visit (from the past 24 hour(s)).     Impression   This is a 53 year old  female who was presented to [facility] after being seen by Dr. Artis at his office, [facility]. Today while speaking with Dr. Artis, the patient proceeded to appear anxious. She ruminated on negative things in regards of herself and disbelieved she has the ability to get better. She will undergo her second ECT session tomorrow morning as inpatient. Will discuss more tomorrow if patient may continue with treatment as outpatient or if inpatient level of care is more appropriate.       Diagnoses     1. Major depression, recurrent, severe, without psychotic features.     Plan     1. Explained side effects, benefits, and complications of medications to the patient, Pt gave verbal consent.  2. Medication changes: None  3. Discussed treatment plan with patient and team.  4. Projected length of stay: 7+ days  5. Patient will undergo her second ECT session on 4/26/19 as inpatient     Attestation:   Patient has been seen and evaluated by me, Osei Artis MD.    Patient ID:  Name: Celia Zhong    MRN: [id number]  Admission: 4/22/2019   YOB: 1965

## 2019-04-25 NOTE — PLAN OF CARE
Problem: Adult Behavioral Health Plan of Care  Goal: Team Discussion  Description  Team Plan:  4/25/2019 1814 by Diogenes Flaherty  Outcome: No Change  Note:   BEHAVIORAL TEAM DISCUSSION    Participants: Dr. Artis, , Nursing staff and PA's  Progress: Pt's affect remains unchanged without any insight into situation  Continued Stay Criteria/Rationale: Until completed with ECT Course  Medical/Physical: Continue ECT Course  Precautions:   Behavioral Orders   Procedures    Code 1 - Restrict to Unit    Electroconvulsive therapy     Series of up to 6 treatments. Begin Date: 4/24/19   Treating Psychiatrist providing ECT:  Dr. Roman   Notified on:  4/23/19    Routine Programming     As clinically indicated    Status 15     Every 15 minutes.     Plan: Pt was given medication information and gave verbal consent. Plan of care was discussed with patient and team. Pt will continue ECT course with #2 scheduled for tomorrow morning. Continue hospitalization.  Rationale for change in precautions or plan: N/A

## 2019-04-25 NOTE — PLAN OF CARE
Problem: Depressive Symptoms  Goal: Depressive Symptoms  Description  1. Mood will improve and symptoms improve  2. Work through grief issues  3. Establish medication regime and manage medications   4/24/2019 2228 by Diogenes Flaherty  Outcome: Improving  Flowsheets (Taken 4/24/2019 2220)  Depressive Symptoms Assessed: all  Depressive Symptoms Present: affect;anxiety  Note:   Pt was alert and oriented within the SDU. Pt presents with a full range affect and anxious mood. Pt had her 1st ECT this morning and reported a headache at the beginning of the shift that slowly subsided. Pt was visited by her  this evening. Pt stated that she felt much better in mood once he arrived. Pt attended all unit programming and participated appropriately. Pt denies Si. Pt ate all of her food and was med compliant.

## 2019-04-26 ENCOUNTER — ANESTHESIA (OUTPATIENT)
Dept: SURGERY | Facility: CLINIC | Age: 54
End: 2019-04-26

## 2019-04-26 ENCOUNTER — ANESTHESIA EVENT (OUTPATIENT)
Dept: SURGERY | Facility: CLINIC | Age: 54
End: 2019-04-26

## 2019-04-26 PROCEDURE — 25000125 ZZHC RX 250: Performed by: ANESTHESIOLOGY

## 2019-04-26 PROCEDURE — 90870 ELECTROCONVULSIVE THERAPY: CPT

## 2019-04-26 PROCEDURE — 25000128 H RX IP 250 OP 636: Performed by: NURSE ANESTHETIST, CERTIFIED REGISTERED

## 2019-04-26 PROCEDURE — 25000125 ZZHC RX 250: Performed by: NURSE ANESTHETIST, CERTIFIED REGISTERED

## 2019-04-26 PROCEDURE — 25800030 ZZH RX IP 258 OP 636: Performed by: ANESTHESIOLOGY

## 2019-04-26 PROCEDURE — 25000132 ZZH RX MED GY IP 250 OP 250 PS 637: Performed by: PSYCHIATRY & NEUROLOGY

## 2019-04-26 PROCEDURE — 12400000 ZZH R&B MH

## 2019-04-26 PROCEDURE — 90853 GROUP PSYCHOTHERAPY: CPT

## 2019-04-26 RX ORDER — METHOCARBAMOL 500 MG/1
500 TABLET, FILM COATED ORAL 3 TIMES DAILY
Status: DISCONTINUED | OUTPATIENT
Start: 2019-04-26 | End: 2019-05-07 | Stop reason: HOSPADM

## 2019-04-26 RX ORDER — POLYETHYLENE GLYCOL 3350 17 G/17G
17 POWDER, FOR SOLUTION ORAL DAILY
Status: DISCONTINUED | OUTPATIENT
Start: 2019-04-26 | End: 2019-05-07 | Stop reason: HOSPADM

## 2019-04-26 RX ADMIN — CHOLECALCIFEROL CAP 125 MCG (5000 UNIT) 5000 UNITS: 125 CAP at 09:19

## 2019-04-26 RX ADMIN — SODIUM CHLORIDE, POTASSIUM CHLORIDE, SODIUM LACTATE AND CALCIUM CHLORIDE 500 ML: 600; 310; 30; 20 INJECTION, SOLUTION INTRAVENOUS at 06:11

## 2019-04-26 RX ADMIN — QUETIAPINE FUMARATE 150 MG: 100 TABLET ORAL at 20:58

## 2019-04-26 RX ADMIN — MIRTAZAPINE 30 MG: 30 TABLET, FILM COATED ORAL at 20:57

## 2019-04-26 RX ADMIN — CHOLECALCIFEROL CAP 125 MCG (5000 UNIT) 5000 UNITS: 125 CAP at 09:20

## 2019-04-26 RX ADMIN — LIDOCAINE HYDROCHLORIDE 1 ML: 10 INJECTION, SOLUTION EPIDURAL; INFILTRATION; INTRACAUDAL; PERINEURAL at 06:11

## 2019-04-26 RX ADMIN — ACETAMINOPHEN 650 MG: 325 TABLET, FILM COATED ORAL at 10:14

## 2019-04-26 RX ADMIN — METHOCARBAMOL 500 MG: 500 TABLET, FILM COATED ORAL at 15:43

## 2019-04-26 RX ADMIN — METRONIDAZOLE: 10 GEL TOPICAL at 20:58

## 2019-04-26 RX ADMIN — SUCCINYLCHOLINE CHLORIDE 80 MG: 20 INJECTION, SOLUTION INTRAMUSCULAR; INTRAVENOUS; PARENTERAL at 07:20

## 2019-04-26 RX ADMIN — QUETIAPINE 25 MG: 25 TABLET ORAL at 14:56

## 2019-04-26 RX ADMIN — QUETIAPINE 25 MG: 25 TABLET ORAL at 15:43

## 2019-04-26 RX ADMIN — METHOHEXITAL SODIUM 100 MG: 500 INJECTION, POWDER, LYOPHILIZED, FOR SOLUTION INTRAMUSCULAR; INTRAVENOUS; RECTAL at 07:18

## 2019-04-26 RX ADMIN — POLYETHYLENE GLYCOL 3350 17 G: 17 POWDER, FOR SOLUTION ORAL at 10:14

## 2019-04-26 RX ADMIN — METHOCARBAMOL 500 MG: 500 TABLET, FILM COATED ORAL at 20:57

## 2019-04-26 ASSESSMENT — ACTIVITIES OF DAILY LIVING (ADL)
DRESS: STREET CLOTHES
ORAL_HYGIENE: INDEPENDENT
LAUNDRY: WITH SUPERVISION
DRESS: STREET CLOTHES
ORAL_HYGIENE: INDEPENDENT
HYGIENE/GROOMING: SHOWER
HYGIENE/GROOMING: INDEPENDENT

## 2019-04-26 NOTE — ANESTHESIA PREPROCEDURE EVALUATION
Anesthesia Pre-Procedure Evaluation    Patient: Celia Zhong   MRN: [id number] : 1965          Preoperative Diagnosis: * No pre-op diagnosis entered *    * No procedures listed *    Past Medical History:   Diagnosis Date     Anxiety      Past Surgical History:   Procedure Laterality Date     TUBAL LIGATION         Anesthesia Evaluation     .             ROS/MED HX    ENT/Pulmonary:  - neg pulmonary ROS    (-) sleep apnea   Neurologic:  - neg neurologic ROS     Cardiovascular:  - neg cardiovascular ROS       METS/Exercise Tolerance:     Hematologic:  - neg hematologic  ROS       Musculoskeletal:  - neg musculoskeletal ROS       GI/Hepatic:  - neg GI/hepatic ROS       Renal/Genitourinary:  - ROS Renal section negative       Endo:  - neg endo ROS       Psychiatric:     (+) psychiatric history anxiety and depression      Infectious Disease:  - neg infectious disease ROS       Malignancy:      - no malignancy   Other:                            Physical Exam  Normal systems: cardiovascular, pulmonary and dental    Airway   Mallampati: II  TM distance: >3 FB  Neck ROM: full    Dental     Cardiovascular       Pulmonary             Lab Results   Component Value Date    WBC 7.6 04/10/2019    HGB 13.5 04/10/2019    HCT 40.2 04/10/2019     04/10/2019     2019    POTASSIUM 4.1 2019    CHLORIDE 106 2019    CO2 27 2019    BUN 14 2019    CR 0.83 2019     (H) 2019    TONY 10.2 (H) 2019    MAG 2.3 2019    TSH 1.06 04/10/2019    HCGS Negative 2019       Preop Vitals  BP Readings from Last 3 Encounters:   19 129/88   19 (!) 140/98    Pulse Readings from Last 3 Encounters:   19 104   04/15/19 107      Resp Readings from Last 3 Encounters:   19 16   19 14    SpO2 Readings from Last 3 Encounters:   19 96%   04/10/19 98%      Temp Readings from Last 1 Encounters:   19 37.4  C (99.3  F) (Temporal)    Ht  "Readings from Last 1 Encounters:   04/22/19 1.6 m (5' 3\")      Wt Readings from Last 1 Encounters:   04/23/19 67.6 kg (149 lb)    Estimated body mass index is 26.39 kg/m  as calculated from the following:    Height as of 4/22/19: 1.6 m (5' 3\").    Weight as of 4/23/19: 67.6 kg (149 lb).       Anesthesia Plan      History & Physical Review  History and physical reviewed and following examination; no interval change.    ASA Status:  2 .    NPO Status:  > 8 hours    Plan for General (with mask ) with Intravenous induction.          Postoperative Care  Postoperative pain management:  IV analgesics.      Consents  Anesthetic plan, risks, benefits and alternatives discussed with:  Patient..                   Bronson Jeff MD  "

## 2019-04-26 NOTE — ANESTHESIA POSTPROCEDURE EVALUATION
Patient: Celia Zhong    * No procedures listed *    Diagnosis:* No pre-op diagnosis entered *  Diagnosis Additional Information: No value filed.    Anesthesia Type:  General    Note:  Anesthesia Post Evaluation    Patient location during evaluation: PACU  Patient participation: Able to fully participate in evaluation  Level of consciousness: awake and alert  Pain management: adequate  Airway patency: patent  Cardiovascular status: acceptable  Respiratory status: acceptable and unassisted  Hydration status: acceptable  PONV: none             Last vitals:  Vitals:    04/26/19 0735 04/26/19 0740 04/26/19 0745   BP: 139/88 (!) 141/94 (!) 143/92   Pulse: 98  109   Resp: 18 21 20   Temp:      SpO2: 97% 99% 99%         Electronically Signed By: Bronson Jeff MD  April 26, 2019  7:50 AM

## 2019-04-26 NOTE — PROGRESS NOTES
[facility] Psychiatric Progress Note       Interim History     The patient's care was discussed with the treatment team and chart notes were reviewed. Pt seen on 4/26/19 by Dr. Artis. Patient underwent her second ECT session this morning, where there were no complications. Denies any nausea or memory loss from ECT. Patient notes she is feeling better than she previously was. She states she would like to go home today because she gets nervous when being away from home. Dr. Artis would like to contact patients  this morning to see if he deems patient appropriate for discharge today. If does discharge today, patient would continue with her last 3 ECT sessions as outpatient.      Hospital Course   This is a 53 year old  female who was presented to [facility] on 4/22 after seeing Dr. Artis at his office, [facility]. Patient was very weepy, fearful, and appeared to decompensate after her discharge from [facility] on 4/16/19. Dr. Artis deemed patient to be appropriate for inpatient level care. Patient was an appropriate candidate for ECT treatment. This treatment would be able to address patients significant symptoms of anxiety and depression far more quickly in comparison to medications at this time. Patient underwent her first session on 4/24/19 as inpatient where there were no complications. Patient appeared to be much improved in comparison to admission after her first ECT session. She even noted feeling improvement in overall anxiety, however continued to complain of physical symptoms such as muscle tightness. Patient had expressed her desire to undergo her second ECT session on 4/26/19 as inpatient rather than outpatient.      Medications     Current Facility-Administered Medications Ordered in Epic   Medication Dose Route Frequency Last Rate Last Dose     acetaminophen (TYLENOL) tablet 650 mg  650 mg Oral Q4H PRN   650 mg at 04/24/19 0825     cholecalciferol  "(VITAMIN D3) 5000 units (125 mcg) capsule 5,000 Units  5,000 Units Oral Daily   5,000 Units at 04/25/19 0824     famotidine (PEPCID) tablet 40 mg  40 mg Oral Daily PRN         hydrOXYzine (ATARAX) tablet 25-50 mg  25-50 mg Oral Q4H PRN   25 mg at 04/24/19 2058     lactated ringers infusion  500 mL Intravenous Continuous 25 mL/hr at 04/26/19 0611 500 mL at 04/26/19 0611     lidocaine 1 % 0.1-1 mL  0.1-1 mL Other Q1H PRN   1 mL at 04/26/19 0611     methylcellulose (CITRUCEL) tablet 1,000 mg  1,000 mg Oral Daily PRN   1,000 mg at 04/24/19 1201     metroNIDAZOLE (METROGEL) 1 % gel   Topical Daily         mirtazapine (REMERON) tablet 30 mg  30 mg Oral At Bedtime   30 mg at 04/25/19 2042     propranolol (INDERAL) tablet 10 mg  10 mg Oral TID PRN   10 mg at 04/24/19 1610     QUEtiapine (SEROquel) tablet 150 mg  150 mg Oral At Bedtime   150 mg at 04/25/19 2043     QUEtiapine (SEROquel) tablet 25 mg  25 mg Oral BID   25 mg at 04/25/19 1600     sodium chloride (OCEAN) 0.65 % nasal spray 1 spray  1 spray Both Nostrils Daily PRN         vortioxetine (TRINTELLIX/BRINTELLIX) tablet 5 mg  5 mg Oral Daily   5 mg at 04/25/19 0824     No current [facility]-ordered outpatient medications on file.         Allergies      No Known Allergies     Medical Review of Systems     /87   Pulse 114   Temp 98.8  F (37.1  C) (Temporal)   Resp 17   Ht 1.6 m (5' 3\")   Wt 67.6 kg (149 lb)   SpO2 95%   BMI 26.39 kg/m    Body mass index is 26.39 kg/m .  A 10-point review of systems was performed by Osei Artis MD and is negative, no new findings.      Psychiatric Examination     Appearance Sitting in chair, dressed in hospital scrubs. Appears stated age.   Attitude Cooperative   Orientation Oriented to person, place, time   Eye Contact Good   Speech Regular rate, rhythm, volume and tone   Language Normal   Psychomotor Behavior Normal   Mood Anxious, hopeless    Affect Anxious   Thought Process Goal-Oriented, Intact   Associations Intact "   Thought Content Patient is currently negative for suicidal ideation, negative for plan or intent, able to contract no self harm and identify barriers to suicide.  Negative for obsessions, compulsions or psychosis.     Fund of Knowledge Intact   Insight Poor   Judgement Limited   Attention Span & Concentration Intact   Recent & Remote Memory Intact   Gait Normal   Muscle Tone Intact        Labs     Labs reviewed.  No results found for this or any previous visit (from the past 24 hour(s)).     Impression   This is a 53 year old  female who was presented to [facility] after being seen by Dr. Artis at his office, [facility]. Patient received her second ECT session this morning, where there were no complications. She denied any side effects such memory loss or nausea from this therapy. Although patient expressed her desire to go home today, Dr. Artis would like to contact patients  first to obtain his intake on patients current state and if he deems patient appropriate for discharge. If patient does discharge later today, she will continue with her last 3 ECT sessions as outpatient.       Diagnoses     1. Major depression, recurrent, severe, without psychotic features.     Plan     1. Explained side effects, benefits, and complications of medications to the patient, Pt gave verbal consent.  2. Medication changes: None  3. Discussed treatment plan with patient and team.  4. Projected length of stay: 7+ days  5. Patient will undergo her third ECT session on 4/29/19    Attestation:   Patient has been seen and evaluated by me, Osei Artis MD.    Patient ID:  Name: Celia Zhong    MRN: [id number]  Admission: 4/22/2019   YOB: 1965

## 2019-04-26 NOTE — PROCEDURES
[facility] ECT Procedure Note     Celia MORGAN Stifter [id number]   53 year old 1965     Patient Status: Inpatient    No Known Allergies    Weight:  149 lbs 0 oz    Patient Preparations: (none)         Diagnosis:   Major depression       Indications for ECT:   Medications ineffective       Pause for the Cause:     Right patient Yes   Right procedure/laterality settings: Yes   Right diagnosis Yes          Intra-Procedure Documentation:     Date:  4/26/2019  Time:  6:22 AM    ECT #    Treatment number this series: 2   Total treatment number: 2   Type of ECT:  Bilateral, standard    ECT Medications administered: See MAR and Anesthesia record         Clinical Narrative:     ECT was administered by Thymatron machine.  Pt has been medically cleared for procedure, consent signed. Side effects, Risks and benefits reviewed.    ECT Strip Summary:   Energy Level: 60 percent  Motor Seizure Duration: 30 seconds  EEG Seizure Duration: 30 seconds    Complications: No    Plan: 3rd ECT 4/29/19  Outpatient Psychiatrist: Dr Artis

## 2019-04-26 NOTE — PLAN OF CARE
Dr bocanegra phoned [facility], discharge on hold for now. Dr Bocanegra stated he would come tomorrow to see pt and re-assess; pt accepted this. Trintellix order increased to 10mg.  Visited by  tonight. Pt attended Wrap-up group. Pt present in lounge, showered, eating well, med compliant.

## 2019-04-26 NOTE — PLAN OF CARE
"  Problem: Depressive Symptoms  Goal: Depressive Symptoms  Description  1. Mood will improve and symptoms improve  2. Work through grief issues  3. Establish medication regime and manage medications   4/25/2019 2133 by June Douglas  Outcome: No Change  Flowsheets (Taken 4/24/2019 2220 by Diogenes Flaherty)  Depressive Symptoms Assessed: all  Depressive Symptoms Present: affect;anxiety  Note:   Pt presents with blunt affect and hopeless mood. Pt was intermittently visible on the unit, did not attend groups. Switches between eye contact with examiner and staring into space. Pt reports feeling \"lost,\" did not further clarify. When asked about care plan pt states that she has the option to discharge tomorrow (Fri) but said \"I don't find that likely.\" Pt's  visited, mostly spent visiting hours in her room- aside from socializing with other patients in the [facility] for a bit. Is med compliant. Pt denies experiencing hallucinations or feelings of SI.      "

## 2019-04-26 NOTE — PLAN OF CARE
Patient still anxious and ruminates about what she is suppose to be doing. Attending groups and this distracts her but becomes very anxious if she is not occupied. There is a discharge order in but Dr. Artis was to talk to the  and was not inclined to discharge her today because of her anxiety. As of 1530 the MD has not called back to give up the OK for discharge.

## 2019-04-27 PROCEDURE — 90853 GROUP PSYCHOTHERAPY: CPT

## 2019-04-27 PROCEDURE — 25000132 ZZH RX MED GY IP 250 OP 250 PS 637: Performed by: PSYCHIATRY & NEUROLOGY

## 2019-04-27 PROCEDURE — 12400000 ZZH R&B MH

## 2019-04-27 RX ADMIN — CHOLECALCIFEROL CAP 125 MCG (5000 UNIT) 5000 UNITS: 125 CAP at 07:52

## 2019-04-27 RX ADMIN — QUETIAPINE FUMARATE 150 MG: 100 TABLET ORAL at 21:00

## 2019-04-27 RX ADMIN — VORTIOXETINE 10 MG: 10 TABLET, FILM COATED ORAL at 07:52

## 2019-04-27 RX ADMIN — PROPRANOLOL HYDROCHLORIDE 10 MG: 10 TABLET ORAL at 15:43

## 2019-04-27 RX ADMIN — METHOCARBAMOL 500 MG: 500 TABLET, FILM COATED ORAL at 21:00

## 2019-04-27 RX ADMIN — METHOCARBAMOL 500 MG: 500 TABLET, FILM COATED ORAL at 07:52

## 2019-04-27 RX ADMIN — QUETIAPINE 25 MG: 25 TABLET ORAL at 15:44

## 2019-04-27 RX ADMIN — MIRTAZAPINE 30 MG: 30 TABLET, FILM COATED ORAL at 21:00

## 2019-04-27 RX ADMIN — METHOCARBAMOL 500 MG: 500 TABLET, FILM COATED ORAL at 15:43

## 2019-04-27 RX ADMIN — METRONIDAZOLE: 10 GEL TOPICAL at 21:07

## 2019-04-27 RX ADMIN — QUETIAPINE 25 MG: 25 TABLET ORAL at 07:52

## 2019-04-27 RX ADMIN — POLYETHYLENE GLYCOL 3350 17 G: 17 POWDER, FOR SOLUTION ORAL at 07:53

## 2019-04-27 ASSESSMENT — ACTIVITIES OF DAILY LIVING (ADL)
DRESS: STREET CLOTHES
HYGIENE/GROOMING: INDEPENDENT
ORAL_HYGIENE: INDEPENDENT

## 2019-04-27 NOTE — PLAN OF CARE
"Patients reports being very anxious (heart is pounding, I can't think straight) and continues to ruminate about things she feels she can't do anymore. Reviewed with her some self-coping strategies that could potentially help her decrease her stress level. Pt reluctant to try because \" I can't retain any information.\" Printed out some relaxation strategies and went over them with her and asked her to practice. Visible all shift, out in lounge or walking in hallway.  came for visit and writer spoke with him about relaxation packet given to his wife and that maybe they could go over it together. Pt did laundry. Med compliant  "

## 2019-04-27 NOTE — PLAN OF CARE
Pt was visited by her  ; this couple is pleasantly attentive to each other. Discharge is cancelled for now per Dr Artis's phone call.  Dr Artis came to see pt and her  @ 7pm in the conference room; plan is to remain in the hospital for the weekend; next ECT is Monday 4/29/19. Pt and  accepted plan. Pt attended Wrap-up group; eating well and med compliant.

## 2019-04-28 PROCEDURE — 25000132 ZZH RX MED GY IP 250 OP 250 PS 637: Performed by: PSYCHIATRY & NEUROLOGY

## 2019-04-28 PROCEDURE — 90853 GROUP PSYCHOTHERAPY: CPT

## 2019-04-28 PROCEDURE — 12400000 ZZH R&B MH

## 2019-04-28 RX ADMIN — CHOLECALCIFEROL CAP 125 MCG (5000 UNIT) 5000 UNITS: 125 CAP at 07:45

## 2019-04-28 RX ADMIN — METHOCARBAMOL 500 MG: 500 TABLET, FILM COATED ORAL at 16:17

## 2019-04-28 RX ADMIN — METHOCARBAMOL 500 MG: 500 TABLET, FILM COATED ORAL at 07:45

## 2019-04-28 RX ADMIN — METRONIDAZOLE: 10 GEL TOPICAL at 20:47

## 2019-04-28 RX ADMIN — VORTIOXETINE 10 MG: 10 TABLET, FILM COATED ORAL at 07:45

## 2019-04-28 RX ADMIN — QUETIAPINE FUMARATE 150 MG: 100 TABLET ORAL at 20:44

## 2019-04-28 RX ADMIN — QUETIAPINE 25 MG: 25 TABLET ORAL at 16:17

## 2019-04-28 RX ADMIN — METHOCARBAMOL 500 MG: 500 TABLET, FILM COATED ORAL at 20:44

## 2019-04-28 RX ADMIN — QUETIAPINE 25 MG: 25 TABLET ORAL at 07:45

## 2019-04-28 RX ADMIN — MIRTAZAPINE 30 MG: 30 TABLET, FILM COATED ORAL at 20:44

## 2019-04-28 ASSESSMENT — ACTIVITIES OF DAILY LIVING (ADL)
DRESS: INDEPENDENT
HYGIENE/GROOMING: INDEPENDENT
ORAL_HYGIENE: INDEPENDENT

## 2019-04-28 NOTE — PLAN OF CARE
visited. Pt reminded not to eat or drink after midnight due to ECT#3 tomorrow morning. Declined groups this evening. Med compliant, eating well.

## 2019-04-28 NOTE — PROGRESS NOTES
[facility] Psychiatric Progress Note       Interim History     The patient's care was discussed with the treatment team and chart notes were reviewed. Pt seen on 4/29/19 by Dr. Artis. Patient states she is doing well this morning. She underwent her third ECT session this morning where there were no complications. Patient notes that she always feels good (in mood) immediately after ECT, however soon after becomes tight throughout her body and experiences anxiety. Patient does appear to be far calmer this morning in comparison to previous days. She will undergo her fourth session on Wednesday (5/01/19) as in patient.      Hospital Course   This is a 53 year old  female who was presented to [facility] on 4/22 after seeing Dr. Artis at his office, [facility]. Patient was very weepy, fearful, and appeared to decompensate after her discharge from [facility] on 4/16/19. Dr. Artis deemed patient to be appropriate for inpatient level care. Patient was an appropriate candidate for ECT treatment. This treatment would be able to address patients significant symptoms of anxiety and depression far more quickly in comparison to medications at this time. Patient underwent her first session on 4/24/19 as inpatient where there were no complications. Patient appeared to be much improved in comparison to admission after her first ECT session. She even noted feeling improvement in overall anxiety, however continued to complain of physical symptoms such as muscle tightness. Patient had expressed her desire to undergo her second ECT session on 4/26/19 as inpatient rather than outpatient.      Medications     Current Facility-Administered Medications Ordered in Epic   Medication Dose Route Frequency Last Rate Last Dose     acetaminophen (TYLENOL) tablet 650 mg  650 mg Oral Q4H PRN   650 mg at 04/26/19 1014     cholecalciferol (VITAMIN D3) 5000 units (125 mcg) capsule 5,000 Units  5,000 Units Oral  "Daily   5,000 Units at 04/27/19 0752     famotidine (PEPCID) tablet 40 mg  40 mg Oral Daily PRN         hydrOXYzine (ATARAX) tablet 25-50 mg  25-50 mg Oral Q4H PRN   25 mg at 04/24/19 2058     lidocaine 1 % 0.1-1 mL  0.1-1 mL Other Q1H PRN   1 mL at 04/26/19 0611     methocarbamol (ROBAXIN) tablet 500 mg  500 mg Oral TID   500 mg at 04/27/19 1543     methylcellulose (CITRUCEL) tablet 1,000 mg  1,000 mg Oral Daily PRN   1,000 mg at 04/24/19 1201     metroNIDAZOLE (METROGEL) 1 % gel   Topical Daily         mirtazapine (REMERON) tablet 30 mg  30 mg Oral At Bedtime   30 mg at 04/26/19 2057     polyethylene glycol (MIRALAX/GLYCOLAX) Packet 17 g  17 g Oral Daily   17 g at 04/27/19 0753     propranolol (INDERAL) tablet 10 mg  10 mg Oral TID PRN   10 mg at 04/27/19 1543     QUEtiapine (SEROquel) tablet 150 mg  150 mg Oral At Bedtime   150 mg at 04/26/19 2058     QUEtiapine (SEROquel) tablet 25 mg  25 mg Oral BID   25 mg at 04/27/19 1544     sodium chloride (OCEAN) 0.65 % nasal spray 1 spray  1 spray Both Nostrils Daily PRN         vortioxetine (TRINTELLIX/BRINTELLIX) tablet 10 mg  10 mg Oral Daily   10 mg at 04/27/19 0752     Current Outpatient Medications Ordered in Epic   Medication     vortioxetine (TRINTELLIX/BRINTELLIX) 10 MG tablet         Allergies      No Known Allergies     Medical Review of Systems     BP (!) 147/96   Pulse 93   Temp 97.8  F (36.6  C) (Oral)   Resp 18   Ht 1.6 m (5' 3\")   Wt 67.6 kg (149 lb)   SpO2 95%   BMI 26.39 kg/m    Body mass index is 26.39 kg/m .  A 10-point review of systems was performed by Osei Artis MD and is negative, no new findings.      Psychiatric Examination     Appearance Sitting in chair, dressed in hospital scrubs. Appears stated age.   Attitude Cooperative   Orientation Oriented to person, place, time   Eye Contact Good   Speech Regular rate, rhythm, volume and tone   Language Normal   Psychomotor Behavior Normal   Mood Anxious, hopeless    Affect More calm  "   Thought Process Goal-Oriented, Intact   Associations Intact   Thought Content Patient is currently negative for suicidal ideation, negative for plan or intent, able to contract no self harm and identify barriers to suicide.  Negative for obsessions, compulsions or psychosis.     Fund of Knowledge Intact   Insight Poor   Judgement Limited   Attention Span & Concentration Intact   Recent & Remote Memory Intact   Gait Normal   Muscle Tone Intact        Labs     Labs reviewed.  No results found for this or any previous visit (from the past 24 hour(s)).     Impression   This is a 53 year old  female who was presented to [facility] after being seen by Dr. Artis at his office, [facility]. While meeting with Dr. Artis this morning, the patient appeared far more calm in mood. She returned from her third session of ECT treatment where there were no complications. She noted feeling good immediately after these treatments, however claims that she becomes tight in her muscles which leads to her become anxious with time. She will continue with her fourth session on 5/01/19 as inpatient.       Diagnoses     1. Major depression, recurrent, severe, without psychotic features.     Plan     1. Explained side effects, benefits, and complications of medications to the patient, Pt gave verbal consent.  2. Medication changes: None  3. Discussed treatment plan with patient and team.  4. Projected length of stay: 7+ days  5. Patient will undergo her fourth ECT session on 5/01/19 as inpatient     Attestation:   Patient has been seen and evaluated by me, Osei Artis MD.    Patient ID:  Name: Celia Zhong    MRN: [id number]  Admission: 4/22/2019   YOB: 1965

## 2019-04-28 NOTE — PLAN OF CARE
Pt continues to endorse feeling anxious, encouraged her to review packet of relaxation strategies given to her yesterday. Pt stated that she can't seem to retain any information but that she would look at it again. Pt had visitors around lunchtime. ECT # 3 tomorrow. Med compliant

## 2019-04-29 ENCOUNTER — ANESTHESIA (OUTPATIENT)
Dept: SURGERY | Facility: CLINIC | Age: 54
End: 2019-04-29

## 2019-04-29 ENCOUNTER — ANESTHESIA EVENT (OUTPATIENT)
Dept: SURGERY | Facility: CLINIC | Age: 54
End: 2019-04-29

## 2019-04-29 PROCEDURE — 25000132 ZZH RX MED GY IP 250 OP 250 PS 637: Performed by: PSYCHIATRY & NEUROLOGY

## 2019-04-29 PROCEDURE — 90870 ELECTROCONVULSIVE THERAPY: CPT

## 2019-04-29 PROCEDURE — 12400000 ZZH R&B MH

## 2019-04-29 PROCEDURE — 25000128 H RX IP 250 OP 636: Performed by: NURSE ANESTHETIST, CERTIFIED REGISTERED

## 2019-04-29 PROCEDURE — 90853 GROUP PSYCHOTHERAPY: CPT

## 2019-04-29 PROCEDURE — 25000125 ZZHC RX 250: Performed by: NURSE ANESTHETIST, CERTIFIED REGISTERED

## 2019-04-29 PROCEDURE — 25000125 ZZHC RX 250: Performed by: ANESTHESIOLOGY

## 2019-04-29 PROCEDURE — 25800030 ZZH RX IP 258 OP 636: Performed by: ANESTHESIOLOGY

## 2019-04-29 RX ORDER — SODIUM CHLORIDE, SODIUM LACTATE, POTASSIUM CHLORIDE, CALCIUM CHLORIDE 600; 310; 30; 20 MG/100ML; MG/100ML; MG/100ML; MG/100ML
500 INJECTION, SOLUTION INTRAVENOUS CONTINUOUS
Status: DISCONTINUED | OUTPATIENT
Start: 2019-04-29 | End: 2019-05-04

## 2019-04-29 RX ADMIN — POLYETHYLENE GLYCOL 3350 17 G: 17 POWDER, FOR SOLUTION ORAL at 07:58

## 2019-04-29 RX ADMIN — QUETIAPINE FUMARATE 150 MG: 100 TABLET ORAL at 20:39

## 2019-04-29 RX ADMIN — METHOCARBAMOL 500 MG: 500 TABLET, FILM COATED ORAL at 07:58

## 2019-04-29 RX ADMIN — CHOLECALCIFEROL CAP 125 MCG (5000 UNIT) 5000 UNITS: 125 CAP at 07:58

## 2019-04-29 RX ADMIN — SODIUM CHLORIDE, SODIUM LACTATE, POTASSIUM CHLORIDE, CALCIUM CHLORIDE: 600; 310; 30; 20 INJECTION, SOLUTION INTRAVENOUS at 06:38

## 2019-04-29 RX ADMIN — LIDOCAINE HYDROCHLORIDE 1 ML: 10 INJECTION, SOLUTION EPIDURAL; INFILTRATION; INTRACAUDAL; PERINEURAL at 06:28

## 2019-04-29 RX ADMIN — ACETAMINOPHEN 650 MG: 325 TABLET, FILM COATED ORAL at 07:58

## 2019-04-29 RX ADMIN — QUETIAPINE 25 MG: 25 TABLET ORAL at 16:14

## 2019-04-29 RX ADMIN — METRONIDAZOLE: 10 GEL TOPICAL at 20:39

## 2019-04-29 RX ADMIN — VORTIOXETINE 10 MG: 10 TABLET, FILM COATED ORAL at 07:58

## 2019-04-29 RX ADMIN — METHOCARBAMOL 500 MG: 500 TABLET, FILM COATED ORAL at 16:14

## 2019-04-29 RX ADMIN — MIRTAZAPINE 30 MG: 30 TABLET, FILM COATED ORAL at 20:39

## 2019-04-29 RX ADMIN — METHOCARBAMOL 500 MG: 500 TABLET, FILM COATED ORAL at 20:39

## 2019-04-29 RX ADMIN — QUETIAPINE 25 MG: 25 TABLET ORAL at 07:58

## 2019-04-29 RX ADMIN — SUCCINYLCHOLINE CHLORIDE 80 MG: 20 INJECTION, SOLUTION INTRAMUSCULAR; INTRAVENOUS; PARENTERAL at 06:40

## 2019-04-29 RX ADMIN — METHOHEXITAL SODIUM 100 MG: 500 INJECTION, POWDER, LYOPHILIZED, FOR SOLUTION INTRAMUSCULAR; INTRAVENOUS; RECTAL at 06:40

## 2019-04-29 NOTE — ANESTHESIA PREPROCEDURE EVALUATION
Anesthesia Pre-Procedure Evaluation    Patient: Celia Zhong   MRN: [id number] : 1965          Preoperative Diagnosis: depression    ECT    Past Medical History:   Diagnosis Date     Anxiety      Past Surgical History:   Procedure Laterality Date     TUBAL LIGATION         Anesthesia Evaluation     .             ROS/MED HX    ENT/Pulmonary:  - neg pulmonary ROS    (-) sleep apnea   Neurologic:  - neg neurologic ROS     Cardiovascular:  - neg cardiovascular ROS       METS/Exercise Tolerance:     Hematologic:  - neg hematologic  ROS       Musculoskeletal:  - neg musculoskeletal ROS       GI/Hepatic:  - neg GI/hepatic ROS       Renal/Genitourinary:  - ROS Renal section negative       Endo:  - neg endo ROS       Psychiatric:     (+) psychiatric history anxiety and depression      Infectious Disease:  - neg infectious disease ROS       Malignancy:      - no malignancy   Other:                            Physical Exam  Normal systems: cardiovascular, pulmonary and dental    Airway   Mallampati: II  TM distance: >3 FB  Neck ROM: full    Dental     Cardiovascular       Pulmonary             Lab Results   Component Value Date    WBC 7.6 04/10/2019    HGB 13.5 04/10/2019    HCT 40.2 04/10/2019     04/10/2019     2019    POTASSIUM 4.1 2019    CHLORIDE 106 2019    CO2 27 2019    BUN 14 2019    CR 0.83 2019     (H) 2019    TONY 10.2 (H) 2019    MAG 2.3 2019    TSH 1.06 04/10/2019    HCGS Negative 2019       Preop Vitals  BP Readings from Last 3 Encounters:   19 (!) 141/96   19 (!) 140/98    Pulse Readings from Last 3 Encounters:   19 93   04/15/19 107      Resp Readings from Last 3 Encounters:   19 16   19 14    SpO2 Readings from Last 3 Encounters:   19 95%   04/10/19 98%      Temp Readings from Last 1 Encounters:   19 36.4  C (97.6  F) (Oral)    Ht Readings from Last 1 Encounters:   19 1.6 m  "(5' 3\")      Wt Readings from Last 1 Encounters:   04/23/19 67.6 kg (149 lb)    Estimated body mass index is 26.39 kg/m  as calculated from the following:    Height as of 4/22/19: 1.6 m (5' 3\").    Weight as of 4/23/19: 67.6 kg (149 lb).       Anesthesia Plan      History & Physical Review  History and physical reviewed and following examination; no interval change.    ASA Status:  2 .    NPO Status:  > 8 hours    Plan for General (with mask ) with Intravenous induction.          Postoperative Care  Postoperative pain management:  IV analgesics.      Consents  Anesthetic plan, risks, benefits and alternatives discussed with:  Patient..                   Jose A Burch MD  "

## 2019-04-29 NOTE — PLAN OF CARE
Goal: Team Discussion  Outcome: No change  BEHAVIORAL TEAM DISCUSSION     Participants:Dr. Artis, social workers, nurses, psych asst   Progress: Pt had ECT this morning, pt is negative towards herself.   Continued Stay Criteria/Rationale: Pt will remain on the unit until ECT sessions are completed.   Medical/Physical:N/A  Precautions:        Behavioral Orders   Procedures    Code 1 - Restrict to Unit    Routine Programming       As clinically indicated    Seizure precautions    Status 15       Every 15 minutes.      Plan: Pt will remain on the unit and continue ECT treatments.   Rationale for change in precautions or plan: Pt still needs further treatment and observation.

## 2019-04-29 NOTE — PLAN OF CARE
"Pt had ECT #3, pt appears less tense and anxious. Pt endorses feeling less anxious. She does believe that ECT is helping her, although she is still feeling that \"little things\" are irritating her .Pt spent most of morning bed resting but was more visible after lunch. Reviewed with patient ways to help reduce anxiety, pt given headphones and word searches to try. Med compliant.  "

## 2019-04-29 NOTE — PROCEDURES
[facility] ECT Procedure Note     Celia MORGAN Stifter [id number]   53 year old 1965     Patient Status: Inpatient    No Known Allergies    Weight:  149 lbs 0 oz    Patient Preparations: (none)         Diagnosis:   Major depression       Indications for ECT:   Medications ineffective       Pause for the Cause:     Right patient Yes   Right procedure/laterality settings: Yes   Right diagnosis Yes          Intra-Procedure Documentation:     Date:  4/29/2019  Time:  6:22 AM    ECT #    Treatment number this series: 3   Total treatment number: 3   Type of ECT:  Bilateral, standard    ECT Medications administered: See MAR and Anesthesia record         Clinical Narrative:     ECT was administered by Thymatron machine.  Pt has been medically cleared for procedure, consent signed. Side effects, Risks and benefits reviewed.    ECT Strip Summary:   Energy Level: 70 percent  Motor Seizure Duration: 30 seconds  EEG Seizure Duration: 30 seconds    Complications: No    Plan: 4th ECT 5/1/19  Outpatient Psychiatrist: Dr Artis

## 2019-04-29 NOTE — ANESTHESIA POSTPROCEDURE EVALUATION
Patient: Celia Zhong    * No procedures listed *    Diagnosis:* No pre-op diagnosis entered *  Diagnosis Additional Information: No value filed.    Anesthesia Type:  General    Note:  Anesthesia Post Evaluation    Patient location during evaluation: PACU  Patient participation: Able to fully participate in evaluation  Level of consciousness: awake and alert  Pain management: adequate  Airway patency: patent  Cardiovascular status: hemodynamically stable and acceptable  Respiratory status: acceptable and room air  Hydration status: euvolemic  PONV: none     Anesthetic complications: None          Last vitals:  Vitals:    04/28/19 1600 04/29/19 0544 04/29/19 0710   BP: (!) 147/103 (!) 141/96 (!) 143/94   Pulse:   96   Resp: 18 16 15   Temp: 37.1  C (98.7  F) 36.4  C (97.6  F)    SpO2:   100%         Electronically Signed By: Jose A Burch MD  April 29, 2019  7:16 AM

## 2019-04-30 PROCEDURE — 12400000 ZZH R&B MH

## 2019-04-30 PROCEDURE — 90853 GROUP PSYCHOTHERAPY: CPT

## 2019-04-30 PROCEDURE — 25000132 ZZH RX MED GY IP 250 OP 250 PS 637: Performed by: PSYCHIATRY & NEUROLOGY

## 2019-04-30 RX ADMIN — VORTIOXETINE 10 MG: 10 TABLET, FILM COATED ORAL at 07:55

## 2019-04-30 RX ADMIN — METHOCARBAMOL 500 MG: 500 TABLET, FILM COATED ORAL at 16:20

## 2019-04-30 RX ADMIN — MIRTAZAPINE 30 MG: 30 TABLET, FILM COATED ORAL at 20:24

## 2019-04-30 RX ADMIN — QUETIAPINE 25 MG: 25 TABLET ORAL at 07:55

## 2019-04-30 RX ADMIN — POLYETHYLENE GLYCOL 3350 17 G: 17 POWDER, FOR SOLUTION ORAL at 07:55

## 2019-04-30 RX ADMIN — METRONIDAZOLE: 10 GEL TOPICAL at 20:24

## 2019-04-30 RX ADMIN — Medication 1 SPRAY: at 20:52

## 2019-04-30 RX ADMIN — METHOCARBAMOL 500 MG: 500 TABLET, FILM COATED ORAL at 07:55

## 2019-04-30 RX ADMIN — QUETIAPINE FUMARATE 150 MG: 100 TABLET ORAL at 20:24

## 2019-04-30 RX ADMIN — QUETIAPINE 25 MG: 25 TABLET ORAL at 16:20

## 2019-04-30 RX ADMIN — METHOCARBAMOL 500 MG: 500 TABLET, FILM COATED ORAL at 20:24

## 2019-04-30 RX ADMIN — CHOLECALCIFEROL CAP 125 MCG (5000 UNIT) 5000 UNITS: 125 CAP at 07:55

## 2019-04-30 ASSESSMENT — MIFFLIN-ST. JEOR: SCORE: 1239.08

## 2019-04-30 NOTE — PROGRESS NOTES
"[facility] Psychiatric Progress Note       Interim History     The patient's care was discussed with the treatment team and chart notes were reviewed. Per attending hospital staff on [address], the patient has expressed her thoughts on ECT, believing this form of treatment has been helping her, although she does attain some irritation from anxiety. She has been more social and pleasant with her peers on SDU. Pt seen on 4/30/19 by Dr. Artis. Patient reports that she has been able to attain more restful sleep, however she still ruminates on negative things. She states, \"I have pains all over my body.\" Dr. Artis would like to increase patients Trintellix dose from 10mg to 15mg daily in order to continue to aid in patients significant anxiety. Aside from this, she will undergo her fourth session on Wednesday (5/01/19) as in patient.      Hospital Course   This is a 53 year old  female who was presented to [facility] on 4/22 after seeing Dr. Artis at his office, [facility]. Patient was very weepy, fearful, and appeared to decompensate after her discharge from [facility] on 4/16/19. Dr. Artis deemed patient to be appropriate for inpatient level care. Patient was an appropriate candidate for ECT treatment. This treatment would be able to address patients significant symptoms of anxiety and depression far more quickly in comparison to medications at this time. Patient underwent her first session on 4/24/19 as inpatient where there were no complications. Patient appeared to be much improved in comparison to admission after her first ECT session. She even noted feeling improvement in overall anxiety, however continued to complain of physical symptoms such as muscle tightness. Patient had expressed her desire to undergo her second ECT session on 4/26/19 as inpatient rather than outpatient.      Medications     Current Facility-Administered Medications Ordered in Epic   Medication " "Dose Route Frequency Last Rate Last Dose     acetaminophen (TYLENOL) tablet 650 mg  650 mg Oral Q4H PRN   650 mg at 04/29/19 0758     cholecalciferol (VITAMIN D3) 5000 units (125 mcg) capsule 5,000 Units  5,000 Units Oral Daily   5,000 Units at 04/29/19 0758     famotidine (PEPCID) tablet 40 mg  40 mg Oral Daily PRN         hydrOXYzine (ATARAX) tablet 25-50 mg  25-50 mg Oral Q4H PRN   25 mg at 04/24/19 2058     lactated ringers infusion  500 mL Intravenous Continuous         lidocaine 1 % 0.1-1 mL  0.1-1 mL Other Q1H PRN   1 mL at 04/29/19 0628     lidocaine patch in PLACE   Transdermal Q8H         methocarbamol (ROBAXIN) tablet 500 mg  500 mg Oral TID   500 mg at 04/29/19 2039     methylcellulose (CITRUCEL) tablet 1,000 mg  1,000 mg Oral Daily PRN   1,000 mg at 04/24/19 1201     metroNIDAZOLE (METROGEL) 1 % gel   Topical Daily         mirtazapine (REMERON) tablet 30 mg  30 mg Oral At Bedtime   30 mg at 04/29/19 2039     polyethylene glycol (MIRALAX/GLYCOLAX) Packet 17 g  17 g Oral Daily   17 g at 04/29/19 0758     propranolol (INDERAL) tablet 10 mg  10 mg Oral TID PRN   10 mg at 04/27/19 1543     QUEtiapine (SEROquel) tablet 150 mg  150 mg Oral At Bedtime   150 mg at 04/29/19 2039     QUEtiapine (SEROquel) tablet 25 mg  25 mg Oral BID   25 mg at 04/29/19 1614     sodium chloride (OCEAN) 0.65 % nasal spray 1 spray  1 spray Both Nostrils Daily PRN         vortioxetine (TRINTELLIX/BRINTELLIX) tablet 10 mg  10 mg Oral Daily   10 mg at 04/29/19 0758     Current Outpatient Medications Ordered in Epic   Medication     vortioxetine (TRINTELLIX/BRINTELLIX) 10 MG tablet         Allergies      No Known Allergies     Medical Review of Systems     /78   Pulse 104   Temp 97.6  F (36.4  C) (Oral)   Resp 16   Ht 1.6 m (5' 3\")   Wt 67.6 kg (149 lb)   SpO2 96%   BMI 26.39 kg/m    Body mass index is 26.39 kg/m .  A 10-point review of systems was performed by Osei Artis MD and is negative, no new findings.      " Psychiatric Examination     Appearance Sitting in chair, dressed in hospital scrubs. Appears stated age.   Attitude Cooperative   Orientation Oriented to person, place, time   Eye Contact Good   Speech Regular rate, rhythm, volume and tone   Language Normal   Psychomotor Behavior Normal   Mood Anxious, hopeless    Affect More calm    Thought Process Goal-Oriented, Intact   Associations Intact   Thought Content Patient is currently negative for suicidal ideation, negative for plan or intent, able to contract no self harm and identify barriers to suicide.  Negative for obsessions, compulsions or psychosis.     Fund of Knowledge Intact   Insight Poor   Judgement Limited   Attention Span & Concentration Intact   Recent & Remote Memory Intact   Gait Normal   Muscle Tone Intact        Labs     Labs reviewed.  No results found for this or any previous visit (from the past 24 hour(s)).     Impression   This is a 53 year old  female who was presented to [facility] after being seen by Dr. Artis at his office, [facility]. Today, the patient noted that she has been able to attain better sleep, however still ruminated on the negative things in her life. Dr. Artis has increased patients Trintellix dose from 10mg to 15mg daily. Aside from this, she will continue with her fourth session on 5/01/19 as inpatient.       Diagnoses     1. Major depression, recurrent, severe, without psychotic features.     Plan     1. Explained side effects, benefits, and complications of medications to the patient, Pt gave verbal consent.  2. Medication changes: Increased Trintellix dose to 15mg daily  3. Discussed treatment plan with patient and team.  4. Projected length of stay: 7+ days  5. Patient will undergo her fourth ECT session on 5/01/19 as inpatient     Attestation:   Patient has been seen and evaluated by me, Osei Artis MD.    Patient ID:  Name: Celia Zhong    MRN: [id number]  Admission: 4/22/2019   Date  of Birth: 1965

## 2019-04-30 NOTE — PLAN OF CARE
"Pt spent the whole shift out of bed, pt was seen playing cards with staff down in the lounge. Pt told staff on a 1-1 that she feels \"lost,\" pt states that she is not self sabotaging but that she feels like she does not know what to do with herself. Staff was able to get patient to preform her coping skill, pt paced the halls listening to headphone. Pt went to groups, pt is med compliant. Pt ate all of her meals.   "

## 2019-04-30 NOTE — PLAN OF CARE
Patient presents with a sad affect and calm mood. She has been socializing with peers and spent most of the evening in the lounge. She was looking forward to wrap-up group tonight and participated appropriately. Her  visited and they played card games.

## 2019-05-01 ENCOUNTER — ANESTHESIA (OUTPATIENT)
Dept: SURGERY | Facility: CLINIC | Age: 54
End: 2019-05-01

## 2019-05-01 ENCOUNTER — ANESTHESIA EVENT (OUTPATIENT)
Dept: SURGERY | Facility: CLINIC | Age: 54
End: 2019-05-01

## 2019-05-01 PROCEDURE — 25800030 ZZH RX IP 258 OP 636: Performed by: ANESTHESIOLOGY

## 2019-05-01 PROCEDURE — 25000132 ZZH RX MED GY IP 250 OP 250 PS 637: Performed by: PSYCHIATRY & NEUROLOGY

## 2019-05-01 PROCEDURE — 25000125 ZZHC RX 250: Performed by: NURSE ANESTHETIST, CERTIFIED REGISTERED

## 2019-05-01 PROCEDURE — 12400000 ZZH R&B MH

## 2019-05-01 PROCEDURE — 90853 GROUP PSYCHOTHERAPY: CPT

## 2019-05-01 PROCEDURE — 90870 ELECTROCONVULSIVE THERAPY: CPT

## 2019-05-01 PROCEDURE — 25000128 H RX IP 250 OP 636: Performed by: NURSE ANESTHETIST, CERTIFIED REGISTERED

## 2019-05-01 RX ADMIN — POLYETHYLENE GLYCOL 3350 17 G: 17 POWDER, FOR SOLUTION ORAL at 08:25

## 2019-05-01 RX ADMIN — VORTIOXETINE 15 MG: 10 TABLET, FILM COATED ORAL at 08:24

## 2019-05-01 RX ADMIN — MIRTAZAPINE 30 MG: 30 TABLET, FILM COATED ORAL at 21:03

## 2019-05-01 RX ADMIN — QUETIAPINE FUMARATE 150 MG: 100 TABLET ORAL at 21:03

## 2019-05-01 RX ADMIN — QUETIAPINE 25 MG: 25 TABLET ORAL at 08:24

## 2019-05-01 RX ADMIN — Medication 1 SPRAY: at 21:05

## 2019-05-01 RX ADMIN — QUETIAPINE 25 MG: 25 TABLET ORAL at 16:06

## 2019-05-01 RX ADMIN — PROPRANOLOL HYDROCHLORIDE 10 MG: 10 TABLET ORAL at 16:05

## 2019-05-01 RX ADMIN — METHOCARBAMOL 500 MG: 500 TABLET, FILM COATED ORAL at 21:03

## 2019-05-01 RX ADMIN — CHOLECALCIFEROL CAP 125 MCG (5000 UNIT) 5000 UNITS: 125 CAP at 08:23

## 2019-05-01 RX ADMIN — METHOCARBAMOL 500 MG: 500 TABLET, FILM COATED ORAL at 16:06

## 2019-05-01 RX ADMIN — METHOHEXITAL SODIUM 100 MG: 500 INJECTION, POWDER, LYOPHILIZED, FOR SOLUTION INTRAMUSCULAR; INTRAVENOUS; RECTAL at 07:20

## 2019-05-01 RX ADMIN — METRONIDAZOLE: 10 GEL TOPICAL at 21:29

## 2019-05-01 RX ADMIN — SODIUM CHLORIDE, SODIUM LACTATE, POTASSIUM CHLORIDE, CALCIUM CHLORIDE 500 ML: 600; 310; 30; 20 INJECTION, SOLUTION INTRAVENOUS at 06:37

## 2019-05-01 RX ADMIN — SUCCINYLCHOLINE CHLORIDE 80 MG: 20 INJECTION, SOLUTION INTRAMUSCULAR; INTRAVENOUS; PARENTERAL at 07:21

## 2019-05-01 RX ADMIN — ACETAMINOPHEN 650 MG: 325 TABLET, FILM COATED ORAL at 08:32

## 2019-05-01 RX ADMIN — METHOCARBAMOL 500 MG: 500 TABLET, FILM COATED ORAL at 08:24

## 2019-05-01 ASSESSMENT — ACTIVITIES OF DAILY LIVING (ADL)
LAUNDRY: WITH SUPERVISION
ORAL_HYGIENE: INDEPENDENT
DRESS: INDEPENDENT
HYGIENE/GROOMING: INDEPENDENT

## 2019-05-01 ASSESSMENT — LIFESTYLE VARIABLES: TOBACCO_USE: 0

## 2019-05-01 ASSESSMENT — ENCOUNTER SYMPTOMS: SEIZURES: 0

## 2019-05-01 NOTE — PROCEDURES
[facility] ECT Procedure Note     Celia MORGAN Stifter [id number]   53 year old 1965     Patient Status: Inpatient    No Known Allergies    Weight:  148 lbs 12.8 oz    Patient Preparations: (none)         Diagnosis:   Major depression       Indications for ECT:   Medications ineffective       Pause for the Cause:     Right patient Yes   Right procedure/laterality settings: Yes   Right diagnosis Yes          Intra-Procedure Documentation:     Date:  5/1/2019  Time:  6:22 AM    ECT #    Treatment number this series: 4   Total treatment number: 4   Type of ECT:  Bilateral, standard    ECT Medications administered: See MAR and Anesthesia record         Clinical Narrative:     ECT was administered by Thymatron machine.  Pt has been medically cleared for procedure, consent signed. Side effects, Risks and benefits reviewed.    ECT Strip Summary:   Energy Level: 80 percent  Motor Seizure Duration: 30 seconds  EEG Seizure Duration: 30 seconds    Complications: No    Plan: 5th ECT 5/3/19  Outpatient Psychiatrist: Dr Artis

## 2019-05-01 NOTE — ANESTHESIA PREPROCEDURE EVALUATION
Anesthesia Pre-Procedure Evaluation    Patient: Celia Sarmientoer   MRN: [id number] : 1965          Preoperative Diagnosis: * No pre-op diagnosis entered *    * No procedures listed *    Past Medical History:   Diagnosis Date     Anxiety      Past Surgical History:   Procedure Laterality Date     TUBAL LIGATION         Anesthesia Evaluation     . Pt has had prior anesthetic.     No history of anesthetic complications          ROS/MED HX    ENT/Pulmonary:      (-) tobacco use, asthma and sleep apnea   Neurologic:      (-) seizures and CVA   Cardiovascular:         METS/Exercise Tolerance:     Hematologic:         Musculoskeletal:         GI/Hepatic:        (-) GERD   Renal/Genitourinary:         Endo:      (-) Type II DM and thyroid disease   Psychiatric:     (+) psychiatric history anxiety and depression      Infectious Disease:         Malignancy:         Other:                          Physical Exam  Normal systems: dental    Airway   Mallampati: II  TM distance: >3 FB  Neck ROM: full    Dental     Cardiovascular   Rhythm and rate: regular and normal      Pulmonary    breath sounds clear to auscultation            Lab Results   Component Value Date    WBC 7.6 04/10/2019    HGB 13.5 04/10/2019    HCT 40.2 04/10/2019     04/10/2019     2019    POTASSIUM 4.1 2019    CHLORIDE 106 2019    CO2 27 2019    BUN 14 2019    CR 0.83 2019     (H) 2019    TONY 10.2 (H) 2019    MAG 2.3 2019    TSH 1.06 04/10/2019    HCGS Negative 2019       Preop Vitals  BP Readings from Last 3 Encounters:   19 (!) 141/93   19 (!) 140/98    Pulse Readings from Last 3 Encounters:   19 95   04/15/19 107      Resp Readings from Last 3 Encounters:   19 16   19 14    SpO2 Readings from Last 3 Encounters:   19 96%   04/10/19 98%      Temp Readings from Last 1 Encounters:   19 36.4  C (97.5  F) (Oral)    Ht Readings from Last 1  "Encounters:   04/22/19 1.6 m (5' 3\")      Wt Readings from Last 1 Encounters:   04/30/19 67.5 kg (148 lb 12.8 oz)    Estimated body mass index is 26.36 kg/m  as calculated from the following:    Height as of this encounter: 1.6 m (5' 3\").    Weight as of this encounter: 67.5 kg (148 lb 12.8 oz).       Anesthesia Plan      History & Physical Review  History and physical reviewed and following examination; no interval change.    ASA Status:  2 .        Plan for General with Intravenous induction.     Plan general ( with mask)      Postoperative Care  Postoperative pain management:  IV analgesics.      Consents  Anesthetic plan, risks, benefits and alternatives discussed with:  Patient..                 Lakshmi Montelongo  "

## 2019-05-01 NOTE — ANESTHESIA CARE TRANSFER NOTE
Patient: Celia Zhong    * No procedures listed *    Diagnosis: * No pre-op diagnosis entered *  Diagnosis Additional Information: No value filed.    Anesthesia Type:   General     Note:  Airway :Face Mask and Nasal Cannula  Patient transferred to:PACU  Comments: At end of procedure, spontaneous respirations, patient alert to voice, able to follow commands. Oxygen via nasal cannula at 4 liters per minute to PACU. Oxygen tubing connected to wall O2 in PACU, SpO2, NiBP, and EKG monitors and alarms on and functioning, Nilo Hugger warmer connected to patient gown, report on patient's clinical status given to PACU RN, RN questions answered.Handoff Report: Identifed the Patient, Identified the Reponsible Provider, Reviewed the pertinent medical history, Discussed the surgical course, Reviewed Intra-OP anesthesia mangement and issues during anesthesia, Set expectations for post-procedure period and Allowed opportunity for questions and acknowledgement of understanding      Vitals: (Last set prior to Anesthesia Care Transfer)    CRNA VITALS  5/1/2019 0657 - 5/1/2019 0727      5/1/2019             Pulse:  61    SpO2:  98 %    Resp Rate (set):  10                Electronically Signed By: LAVELLE Hay CRNA  May 1, 2019  7:27 AM

## 2019-05-01 NOTE — PLAN OF CARE
:Pt presents with blunt affect, but with calm mood and cooperative . Pt actually believes that she is finally seeing some improvements in mood with  the ECT. Pt stated that she had a better night last night. Pt attended groups, Denies SI and nor hallucination. Pt remains med compliant.

## 2019-05-01 NOTE — ANESTHESIA POSTPROCEDURE EVALUATION
Patient: Celia Zhong    * No procedures listed *    Diagnosis:* No pre-op diagnosis entered *  Diagnosis Additional Information: No value filed.    Anesthesia Type:  General    Note:  Anesthesia Post Evaluation    Patient location during evaluation: PACU  Level of consciousness: awake  Pain management: adequate  Airway patency: patent  Cardiovascular status: acceptable  Respiratory status: acceptable  Hydration status: acceptable  PONV: none             Last vitals:  Vitals:    05/01/19 0800 05/01/19 0815 05/01/19 1605   BP: (!) 128/95 (!) 131/95 131/75   Pulse:  102 102   Resp: 16 16 16   Temp:  37  C (98.6  F) 36.8  C (98.3  F)   SpO2: 94%           Electronically Signed By: Lakshmi Montelongo  May 1, 2019  5:48 PM

## 2019-05-01 NOTE — PLAN OF CARE
Pt transitioned to PM mindfulness group with MIN encouragement. Pt actively participated in a mindfulness group focusing on reduction of anxiety, improvement of mood, and increase in concentration. The mindfulness practice was offered via supportive approaches including breathing techniques and mindful movement. Pt will benefit from further identifying and  implementing positive functional coping skills.

## 2019-05-01 NOTE — PLAN OF CARE
Pt presents with a blunt affect and anxious/depressed mood. Pt expressed that she has not noticed a difference in her mood or anxiety, but is hoping ECT will help. Expressed having trouble sleeping at night, tried to stay awake all day to help regulate her sleep cycle. Was visible on the unit, but kept to herself. Did not attend groups. Denies SI. Med compliant.

## 2019-05-01 NOTE — PROGRESS NOTES
[facility] Psychiatric Progress Note       Interim History     The patient's care was discussed with the treatment team and chart notes were reviewed. Patient has been medication compliant, cooperative, and respectful to her peers and staff members. She has not noticed any improvement in her overall mood, but is hopeful that ECT will help. Pt seen on 5/01/19 by Dr. Artis. Patient appears to be improved in mood, however she still complains about feelings of anxiety and the inability to do certain things like watching movies or talking with others about certain topics.       Hospital Course   This is a 53 year old  female who was presented to [facility] on 4/22 after seeing Dr. Artis at his office, [facility]. Patient was very weepy, fearful, and appeared to decompensate after her discharge from [facility] on 4/16/19. Dr. Artis deemed patient to be appropriate for inpatient level care. Patient was an appropriate candidate for ECT treatment. This treatment would be able to address patients significant symptoms of anxiety and depression far more quickly in comparison to medications at this time. Patient underwent her first session on 4/24/19 as inpatient where there were no complications. Patient appeared to be much improved in comparison to admission after her first ECT session. She even noted feeling improvement in overall anxiety, however continued to complain of physical symptoms such as muscle tightness. Patient had expressed her desire to undergo her second ECT session on 4/26/19 as inpatient rather than outpatient.      Medications     Current Facility-Administered Medications Ordered in Epic   Medication Dose Route Frequency Last Rate Last Dose     acetaminophen (TYLENOL) tablet 650 mg  650 mg Oral Q4H PRN   650 mg at 05/01/19 0832     cholecalciferol (VITAMIN D3) 5000 units (125 mcg) capsule 5,000 Units  5,000 Units Oral Daily   5,000 Units at 05/01/19 0823     famotidine  "(PEPCID) tablet 40 mg  40 mg Oral Daily PRN         hydrOXYzine (ATARAX) tablet 25-50 mg  25-50 mg Oral Q4H PRN   25 mg at 04/24/19 2058     lactated ringers infusion  500 mL Intravenous Continuous 25 mL/hr at 05/01/19 0637 500 mL at 05/01/19 0637     lidocaine 1 % 0.1-1 mL  0.1-1 mL Other Q1H PRN         lidocaine patch in PLACE   Transdermal Q8H         lidocaine patch in PLACE   Transdermal Q8H         methocarbamol (ROBAXIN) tablet 500 mg  500 mg Oral TID   500 mg at 05/01/19 0824     methylcellulose (CITRUCEL) tablet 1,000 mg  1,000 mg Oral Daily PRN   1,000 mg at 04/24/19 1201     metroNIDAZOLE (METROGEL) 1 % gel   Topical Daily         mirtazapine (REMERON) tablet 30 mg  30 mg Oral At Bedtime   30 mg at 04/30/19 2024     polyethylene glycol (MIRALAX/GLYCOLAX) Packet 17 g  17 g Oral Daily   17 g at 05/01/19 0825     propranolol (INDERAL) tablet 10 mg  10 mg Oral TID PRN   10 mg at 04/27/19 1543     QUEtiapine (SEROquel) tablet 150 mg  150 mg Oral At Bedtime   150 mg at 04/30/19 2024     QUEtiapine (SEROquel) tablet 25 mg  25 mg Oral BID   25 mg at 05/01/19 0824     sodium chloride (OCEAN) 0.65 % nasal spray 1 spray  1 spray Both Nostrils Daily PRN   1 spray at 04/30/19 2052     vortioxetine (TRINTELLIX/BRINTELLIX) tablet 15 mg  15 mg Oral Daily   15 mg at 05/01/19 0824     Current Outpatient Medications Ordered in Epic   Medication     vortioxetine (TRINTELLIX/BRINTELLIX) 10 MG tablet         Allergies      No Known Allergies     Medical Review of Systems     BP (!) 131/95   Pulse 102   Temp 98.6  F (37  C) (Oral)   Resp 16   Ht 1.6 m (5' 3\")   Wt 67.5 kg (148 lb 12.8 oz)   SpO2 94%   BMI 26.36 kg/m    Body mass index is 26.36 kg/m .  A 10-point review of systems was performed by Osei Artis MD and is negative, no new findings.      Psychiatric Examination     Appearance Sitting in chair, dressed in hospital scrubs. Appears stated age.   Attitude Cooperative   Orientation Oriented to person, " place, time   Eye Contact Good   Speech Regular rate, rhythm, volume and tone   Language Normal   Psychomotor Behavior Normal   Mood Anxious, hopeless    Affect More calm    Thought Process Goal-Oriented, Intact   Associations Intact   Thought Content Patient is currently negative for suicidal ideation, negative for plan or intent, able to contract no self harm and identify barriers to suicide.  Negative for obsessions, compulsions or psychosis.     Fund of Knowledge Intact   Insight Poor   Judgement Limited   Attention Span & Concentration Intact   Recent & Remote Memory Intact   Gait Normal   Muscle Tone Intact        Labs     Labs reviewed.  No results found for this or any previous visit (from the past 24 hour(s)).     Impression   This is a 53 year old  female who was presented to [facility] after being seen by Dr. Artis at his office, [facility]. Per attending hospital staff notes, the patient has been medication compliant, cooperative, respectful, and becoming more social. She however has continued to report that she is feeling lost and has not noticed a difference in mood or anxiety. She is hoping that ECT will help with her overall mood.      Diagnoses     1. Major depression, recurrent, severe, without psychotic features.     Plan     1. Explained side effects, benefits, and complications of medications to the patient, Pt gave verbal consent.  2. Medication changes: none   3. Discussed treatment plan with patient and team.  4. Projected length of stay: 7+ days  5. Patient will undergo her fifth ECT session on 5/03/19 as inpatient     Attestation:   Patient has been seen and evaluated by me, Osei Artis MD.    Patient ID:  Name: Celia Zhong    MRN: [id number]  Admission: 4/22/2019   YOB: 1965

## 2019-05-02 PROCEDURE — 25000132 ZZH RX MED GY IP 250 OP 250 PS 637: Performed by: PSYCHIATRY & NEUROLOGY

## 2019-05-02 PROCEDURE — 90853 GROUP PSYCHOTHERAPY: CPT

## 2019-05-02 PROCEDURE — 12400000 ZZH R&B MH

## 2019-05-02 RX ORDER — QUETIAPINE FUMARATE 50 MG/1
50 TABLET, FILM COATED ORAL 2 TIMES DAILY
Status: DISCONTINUED | OUTPATIENT
Start: 2019-05-02 | End: 2019-05-07 | Stop reason: HOSPADM

## 2019-05-02 RX ORDER — ATENOLOL 25 MG/1
25 TABLET ORAL DAILY
Status: DISCONTINUED | OUTPATIENT
Start: 2019-05-02 | End: 2019-05-07 | Stop reason: HOSPADM

## 2019-05-02 RX ADMIN — METHOCARBAMOL 500 MG: 500 TABLET, FILM COATED ORAL at 08:12

## 2019-05-02 RX ADMIN — QUETIAPINE FUMARATE 50 MG: 50 TABLET ORAL at 16:29

## 2019-05-02 RX ADMIN — QUETIAPINE FUMARATE 150 MG: 100 TABLET ORAL at 20:50

## 2019-05-02 RX ADMIN — METRONIDAZOLE: 10 GEL TOPICAL at 20:50

## 2019-05-02 RX ADMIN — METHOCARBAMOL 500 MG: 500 TABLET, FILM COATED ORAL at 20:50

## 2019-05-02 RX ADMIN — METHOCARBAMOL 500 MG: 500 TABLET, FILM COATED ORAL at 16:29

## 2019-05-02 RX ADMIN — MIRTAZAPINE 30 MG: 30 TABLET, FILM COATED ORAL at 20:50

## 2019-05-02 RX ADMIN — CHOLECALCIFEROL CAP 125 MCG (5000 UNIT) 5000 UNITS: 125 CAP at 08:12

## 2019-05-02 RX ADMIN — VORTIOXETINE 15 MG: 10 TABLET, FILM COATED ORAL at 08:12

## 2019-05-02 RX ADMIN — QUETIAPINE 25 MG: 25 TABLET ORAL at 08:12

## 2019-05-02 RX ADMIN — ATENOLOL 25 MG: 25 TABLET ORAL at 13:58

## 2019-05-02 NOTE — PLAN OF CARE
Problem: Adult Behavioral Health Plan of Care  Goal: Team Discussion  Description  Team Plan:  5/2/2019 1642 by Dalton Hernandez  Note:   BEHAVIORAL TEAM DISCUSSION    Participants: Dr. Artis, Case Management, Nursing Staff, PA's  Progress: Improving  Continued Stay Criteria/Rationale: ECT  Medical/Physical: ECT  Precautions:   Behavioral Orders   Procedures     Code 1 - Restrict to Unit     Electroconvulsive therapy     Series of up to 6 treatments. Begin Date: 4/24/19   Treating Psychiatrist providing ECT:  Dr. Roman   Notified on:  4/23/19     Electroconvulsive therapy     Series of up to 6 treatments. Begin Date: 4/24/19   Treating Psychiatrist providing ECT:  Dr. Roman   Notified on:  4/23/19     Electroconvulsive therapy     Series of up to 6 treatments. Begin Date: 4/24/19   Treating Psychiatrist providing ECT:  Dr. Roman   Notified on:  4/23/19     Routine Programming     As clinically indicated     Status 15     Every 15 minutes.     Plan: Continue ECT  Rationale for change in precautions or plan: Not at baseline

## 2019-05-02 NOTE — PROGRESS NOTES
[facility] Psychiatric Progress Note       Interim History     The patient's care was discussed with the treatment team and chart notes were reviewed. Patient has been more visible throughout the SDU. She noted to staffing that she feels as though ECT has improved her mood a bit. Her sleep continues to be fine with no complications. Has been medication compliant and attending group sessions appropriately. Pt seen on 5/02/19 by Dr. Artis. On interview, the patient proceeds to complain of physical symptoms caused by her anxiety, such as muscle tightness. Dr. Artis would like to order Atenolol 25mg daily in order to aid in patients complaints. Patient in agreement with this. Additionally, Dr. Artis again discussed aftercare plans with patient. He deems it would be in patients best interest if she were to attend a day program to continue to support and care for her mental health. Patient however proceeds to be somewhat hesitant about this idea because she does not believe she will be able to keep up, speak out, etc. If she were to attend something like a DBT program.      Hospital Course   This is a 53 year old  female who was presented to [facility] on 4/22 after seeing Dr. Artis at his office, [facility]. Patient was very weepy, fearful, and appeared to decompensate after her discharge from [facility] on 4/16/19. Dr. Artis deemed patient to be appropriate for inpatient level care. Patient was an appropriate candidate for ECT treatment. This treatment would be able to address patients significant symptoms of anxiety and depression far more quickly in comparison to medications at this time. Patient underwent her first session on 4/24/19 as inpatient where there were no complications. Patient appeared to be much improved in comparison to admission after her first ECT session. She even noted feeling improvement in overall anxiety, however continued to complain of physical  symptoms such as muscle tightness. Patient had expressed her desire to undergo her second ECT session on 4/26/19 as inpatient rather than outpatient.      Medications     Current Facility-Administered Medications Ordered in Epic   Medication Dose Route Frequency Last Rate Last Dose     acetaminophen (TYLENOL) tablet 650 mg  650 mg Oral Q4H PRN   650 mg at 05/01/19 0832     cholecalciferol (VITAMIN D3) 5000 units (125 mcg) capsule 5,000 Units  5,000 Units Oral Daily   5,000 Units at 05/02/19 0812     famotidine (PEPCID) tablet 40 mg  40 mg Oral Daily PRN         hydrOXYzine (ATARAX) tablet 25-50 mg  25-50 mg Oral Q4H PRN   25 mg at 04/24/19 2058     lactated ringers infusion  500 mL Intravenous Continuous 25 mL/hr at 05/01/19 0637 500 mL at 05/01/19 0637     lidocaine 1 % 0.1-1 mL  0.1-1 mL Other Q1H PRN         lidocaine patch in PLACE   Transdermal Q8H         lidocaine patch in PLACE   Transdermal Q8H         methocarbamol (ROBAXIN) tablet 500 mg  500 mg Oral TID   500 mg at 05/02/19 0812     methylcellulose (CITRUCEL) tablet 1,000 mg  1,000 mg Oral Daily PRN   1,000 mg at 04/24/19 1201     metroNIDAZOLE (METROGEL) 1 % gel   Topical Daily         mirtazapine (REMERON) tablet 30 mg  30 mg Oral At Bedtime   30 mg at 05/01/19 2103     polyethylene glycol (MIRALAX/GLYCOLAX) Packet 17 g  17 g Oral Daily   17 g at 05/01/19 0825     propranolol (INDERAL) tablet 10 mg  10 mg Oral TID PRN   10 mg at 05/01/19 1605     QUEtiapine (SEROquel) tablet 150 mg  150 mg Oral At Bedtime   150 mg at 05/01/19 2103     QUEtiapine (SEROquel) tablet 25 mg  25 mg Oral BID   25 mg at 05/02/19 0812     sodium chloride (OCEAN) 0.65 % nasal spray 1 spray  1 spray Both Nostrils Daily PRN   1 spray at 05/01/19 2105     vortioxetine (TRINTELLIX/BRINTELLIX) tablet 15 mg  15 mg Oral Daily   15 mg at 05/02/19 0812     Current Outpatient Medications Ordered in Epic   Medication     vortioxetine (TRINTELLIX/BRINTELLIX) 10 MG tablet         Allergies  "     No Known Allergies     Medical Review of Systems     /83   Pulse 85   Temp 98  F (36.7  C) (Oral)   Resp 15   Ht 1.6 m (5' 3\")   Wt 67.5 kg (148 lb 12.8 oz)   SpO2 94%   BMI 26.36 kg/m    Body mass index is 26.36 kg/m .  A 10-point review of systems was performed by Osei Artis MD and is negative, no new findings.      Psychiatric Examination     Appearance Sitting in chair, dressed in hospital scrubs. Appears stated age.   Attitude Cooperative   Orientation Oriented to person, place, time   Eye Contact Good   Speech Regular rate, rhythm, volume and tone   Language Normal   Psychomotor Behavior Normal   Mood Anxious, hopeless    Affect More calm    Thought Process Goal-Oriented, Intact   Associations Intact   Thought Content Patient is currently negative for suicidal ideation, negative for plan or intent, able to contract no self harm and identify barriers to suicide.  Negative for obsessions, compulsions or psychosis.     Fund of Knowledge Intact   Insight Poor   Judgement Limited   Attention Span & Concentration Intact   Recent & Remote Memory Intact   Gait Normal   Muscle Tone Intact        Labs     Labs reviewed.  No results found for this or any previous visit (from the past 24 hour(s)).     Impression   This is a 53 year old  female who was presented to [facility] after being seen by Dr. Artis at his office, [facility]. Patient reported to staff members that she has found some improvement in regards of her overall mood since starting ECT treatment. She has been found more visible throughout the SDU, has attended group sessions appropriately, and has been medication compliant. While meeting with Dr. Artis this afternoon, the patient proceeded to complain about physical symptoms caused by her anxiety. These symptoms include muscle tightness. Dr. Artis has ordered Atenolol 25mg daily and increased Seroquel dose from 25mg BID to 50mg BID in order to aid in " patients complaints. Patient will continue with her fifth ECT session tomorrow.      Diagnoses     1. Major depression, recurrent, severe, without psychotic features.     Plan     1. Explained side effects, benefits, and complications of medications to the patient, Pt gave verbal consent.  2. Medication changes: Increased Seroquel dose from 25mg BID to 50mg BID and started Atenolol 25mg daily   3. Discussed treatment plan with patient and team.  4. Projected length of stay: 7+ days  5. Patient will undergo her fifth ECT session on 5/03/19 as inpatient     Attestation:   Patient has been seen and evaluated by me, Osei Artis MD.    Patient ID:  Name: Celia Zhong    MRN: [id number]  Admission: 4/22/2019   YOB: 1965

## 2019-05-02 NOTE — PLAN OF CARE
Problem: OT General Care Plan  Goal: OT Goal 1  Description  With MIN A, pt will demonstrate illness management skills necessary for positive resumption of home and community roles by discharge.      5/2/2019 1404 by Florina Gordon, J CARLOS  Outcome: Improving    With initial task set up and MIN verbal cues, pt participated in therapeutic group activity and discussion addressing balanced scheduling. Educated pt on four categories of activity (self-care, leisure, productivity, and social) and the importance of balance between categories for wellness with pt verbalizing understanding. Pt created pie chart of current breakdown of daily schedule using the four categories and identified where she is out of balance. Pt reports she is somewhat out of balance and spends too much time on productive activities in the evenings. Pt reports she would like to be spending more time participating in leisure. With MIN A, problem-solved strategies to address same. Pt will continue to benefit from OT intervention to address implementation of positive functional coping skills, role performance, and community reintegration.

## 2019-05-02 NOTE — PLAN OF CARE
Pt is more visile in the unit with blunt affect that brightens upon approach. Pt stated that she is dalia focusing too on her breathing so much she wants to control how she breaths. Heart rate was tachycardic and inderal 10 mg po administered that kind of slowed down. Pt thinks she is having some improvement with the ECT. Denies SI and continues to be med compliant.

## 2019-05-02 NOTE — PLAN OF CARE
Patient appears calmer today and less anxious. She states she still feels vacant. Pt will continue inpatient with ECT . Attending groups and is more spontaneous and participates well.

## 2019-05-03 ENCOUNTER — ANESTHESIA (OUTPATIENT)
Dept: SURGERY | Facility: CLINIC | Age: 54
End: 2019-05-03

## 2019-05-03 ENCOUNTER — ANESTHESIA EVENT (OUTPATIENT)
Dept: SURGERY | Facility: CLINIC | Age: 54
End: 2019-05-03

## 2019-05-03 PROCEDURE — 25000125 ZZHC RX 250: Performed by: ANESTHESIOLOGY

## 2019-05-03 PROCEDURE — 90853 GROUP PSYCHOTHERAPY: CPT

## 2019-05-03 PROCEDURE — 25000125 ZZHC RX 250: Performed by: NURSE ANESTHETIST, CERTIFIED REGISTERED

## 2019-05-03 PROCEDURE — 90870 ELECTROCONVULSIVE THERAPY: CPT

## 2019-05-03 PROCEDURE — 12400000 ZZH R&B MH

## 2019-05-03 PROCEDURE — 25000132 ZZH RX MED GY IP 250 OP 250 PS 637: Performed by: PSYCHIATRY & NEUROLOGY

## 2019-05-03 PROCEDURE — 25000128 H RX IP 250 OP 636: Performed by: NURSE ANESTHETIST, CERTIFIED REGISTERED

## 2019-05-03 PROCEDURE — 40000010 ZZH STATISTIC ANES STAT CODE-CRNA PER MINUTE

## 2019-05-03 PROCEDURE — 25800030 ZZH RX IP 258 OP 636: Performed by: ANESTHESIOLOGY

## 2019-05-03 PROCEDURE — 37000008 ZZH ANESTHESIA TECHNICAL FEE, 1ST 30 MIN

## 2019-05-03 RX ORDER — SODIUM CHLORIDE, SODIUM LACTATE, POTASSIUM CHLORIDE, CALCIUM CHLORIDE 600; 310; 30; 20 MG/100ML; MG/100ML; MG/100ML; MG/100ML
500 INJECTION, SOLUTION INTRAVENOUS CONTINUOUS
Status: DISCONTINUED | OUTPATIENT
Start: 2019-05-03 | End: 2019-05-04

## 2019-05-03 RX ADMIN — LIDOCAINE HYDROCHLORIDE 1 ML: 10 INJECTION, SOLUTION EPIDURAL; INFILTRATION; INTRACAUDAL; PERINEURAL at 06:16

## 2019-05-03 RX ADMIN — ATENOLOL 25 MG: 25 TABLET ORAL at 08:02

## 2019-05-03 RX ADMIN — METRONIDAZOLE: 10 GEL TOPICAL at 20:41

## 2019-05-03 RX ADMIN — METHOCARBAMOL 500 MG: 500 TABLET, FILM COATED ORAL at 20:39

## 2019-05-03 RX ADMIN — MIRTAZAPINE 30 MG: 30 TABLET, FILM COATED ORAL at 20:40

## 2019-05-03 RX ADMIN — QUETIAPINE FUMARATE 150 MG: 100 TABLET ORAL at 20:40

## 2019-05-03 RX ADMIN — METHOCARBAMOL 500 MG: 500 TABLET, FILM COATED ORAL at 08:02

## 2019-05-03 RX ADMIN — QUETIAPINE FUMARATE 50 MG: 50 TABLET ORAL at 08:02

## 2019-05-03 RX ADMIN — METHOHEXITAL SODIUM 100 MG: 500 INJECTION, POWDER, LYOPHILIZED, FOR SOLUTION INTRAMUSCULAR; INTRAVENOUS; RECTAL at 06:55

## 2019-05-03 RX ADMIN — POLYETHYLENE GLYCOL 3350 17 G: 17 POWDER, FOR SOLUTION ORAL at 08:01

## 2019-05-03 RX ADMIN — SUCCINYLCHOLINE CHLORIDE 100 MG: 20 INJECTION, SOLUTION INTRAMUSCULAR; INTRAVENOUS; PARENTERAL at 06:55

## 2019-05-03 RX ADMIN — CHOLECALCIFEROL CAP 125 MCG (5000 UNIT) 5000 UNITS: 125 CAP at 08:02

## 2019-05-03 RX ADMIN — SODIUM CHLORIDE, SODIUM LACTATE, POTASSIUM CHLORIDE, CALCIUM CHLORIDE 500 ML: 600; 310; 30; 20 INJECTION, SOLUTION INTRAVENOUS at 06:16

## 2019-05-03 RX ADMIN — QUETIAPINE FUMARATE 50 MG: 50 TABLET ORAL at 15:49

## 2019-05-03 RX ADMIN — METHOCARBAMOL 500 MG: 500 TABLET, FILM COATED ORAL at 15:49

## 2019-05-03 RX ADMIN — VORTIOXETINE 15 MG: 10 TABLET, FILM COATED ORAL at 08:02

## 2019-05-03 ASSESSMENT — ENCOUNTER SYMPTOMS: SEIZURES: 0

## 2019-05-03 ASSESSMENT — LIFESTYLE VARIABLES: TOBACCO_USE: 0

## 2019-05-03 ASSESSMENT — ACTIVITIES OF DAILY LIVING (ADL)
ORAL_HYGIENE: INDEPENDENT
HYGIENE/GROOMING: INDEPENDENT
DRESS: INDEPENDENT
LAUNDRY: WITH SUPERVISION

## 2019-05-03 NOTE — PLAN OF CARE
Pt transitioned to PM mindfulness group with MIN encouragement. Pt actively participated in a mindfulness group focusing on reduction of anxiety, improvement of mood, and increase in concentration. The mindfulness practice was offered via supportive approaches including body scan meditation and mindful movement. Pt was quiet but attentive throughout goup. Pt may benefit from further identifying and  implementing of positive functional coping skills.

## 2019-05-03 NOTE — PLAN OF CARE
Pt presents with a blunt affect and calm mood. Less anxious than previous shifts. Was visible on the unit, and minimally social with peers. Was observed playing a game with peers in the lounge. Denies SI. Med compliant.

## 2019-05-03 NOTE — ANESTHESIA POSTPROCEDURE EVALUATION
Patient: Celia Zhong    * No procedures listed *    Diagnosis:* No pre-op diagnosis entered *  Diagnosis Additional Information: No value filed.    Anesthesia Type:  General    Note:  Anesthesia Post Evaluation    Patient location during evaluation: PACU  Patient participation: Able to fully participate in evaluation  Level of consciousness: awake and alert  Pain management: adequate  Airway patency: patent  Cardiovascular status: acceptable  Respiratory status: acceptable  Hydration status: acceptable  PONV: none and controlled     Anesthetic complications: None          Last vitals:  Vitals:    05/03/19 0710 05/03/19 0715 05/03/19 0720   BP: 115/74 116/70 (!) 124/102   Pulse: 80 69 90   Resp: 10 12 17   Temp:      SpO2: 98% 99% 98%         Electronically Signed By: Gera Yanez MD  May 3, 2019  7:27 AM

## 2019-05-03 NOTE — PROCEDURES
[facility] ECT Procedure Note     Celia MORGAN Stifter [id number]   53 year old 1965     Patient Status: Inpatient    No Known Allergies    Weight:  148 lbs 12.8 oz    Patient Preparations: (none)         Diagnosis:   Major depression       Indications for ECT:   Medications ineffective       Pause for the Cause:     Right patient Yes   Right procedure/laterality settings: Yes   Right diagnosis Yes          Intra-Procedure Documentation:     Date:  5/3/2019  Time:  6:22 AM    ECT #    Treatment number this series: 5   Total treatment number: 5   Type of ECT:  Bilateral, standard    ECT Medications administered: See MAR and Anesthesia record         Clinical Narrative:     ECT was administered by Thymatron machine.  Pt has been medically cleared for procedure, consent signed. Side effects, Risks and benefits reviewed.    ECT Strip Summary:   Energy Level: 90 percent  Motor Seizure Duration: 30 seconds  EEG Seizure Duration: 30 seconds    Complications: No    Plan: 6th ECT 5/6/19  Outpatient Psychiatrist: Dr Artis

## 2019-05-03 NOTE — ANESTHESIA PREPROCEDURE EVALUATION
Anesthesia Pre-Procedure Evaluation    Patient: Celia Sarmientoer   MRN: [id number] : 1965          Preoperative Diagnosis: * Depression    ECT *    Past Medical History:   Diagnosis Date     Anxiety      Past Surgical History:   Procedure Laterality Date     TUBAL LIGATION         Anesthesia Evaluation     . Pt has had prior anesthetic.     No history of anesthetic complications          ROS/MED HX    ENT/Pulmonary:      (-) tobacco use, asthma and sleep apnea   Neurologic:      (-) seizures and CVA   Cardiovascular:         METS/Exercise Tolerance:     Hematologic:         Musculoskeletal:         GI/Hepatic:        (-) GERD   Renal/Genitourinary:         Endo:      (-) Type II DM and thyroid disease   Psychiatric:     (+) psychiatric history anxiety and depression      Infectious Disease:         Malignancy:         Other:                            Physical Exam  Normal systems: dental    Airway   Mallampati: II  TM distance: >3 FB  Neck ROM: full    Dental     Cardiovascular   Rhythm and rate: regular and normal      Pulmonary    breath sounds clear to auscultation            Lab Results   Component Value Date    WBC 7.6 04/10/2019    HGB 13.5 04/10/2019    HCT 40.2 04/10/2019     04/10/2019     2019    POTASSIUM 4.1 2019    CHLORIDE 106 2019    CO2 27 2019    BUN 14 2019    CR 0.83 2019     (H) 2019    TONY 10.2 (H) 2019    MAG 2.3 2019    TSH 1.06 04/10/2019    HCGS Negative 2019       Preop Vitals  BP Readings from Last 3 Encounters:   19 (!) 124/95   19 (!) 140/98    Pulse Readings from Last 3 Encounters:   19 100   04/15/19 107      Resp Readings from Last 3 Encounters:   19 12   19 14    SpO2 Readings from Last 3 Encounters:   19 94%   04/10/19 98%      Temp Readings from Last 1 Encounters:   19 36.4  C (97.5  F) (Oral)    Ht Readings from Last 1 Encounters:   19 1.6 m (5'  "3\")      Wt Readings from Last 1 Encounters:   04/30/19 67.5 kg (148 lb 12.8 oz)    Estimated body mass index is 26.36 kg/m  as calculated from the following:    Height as of 4/22/19: 1.6 m (5' 3\").    Weight as of 4/30/19: 67.5 kg (148 lb 12.8 oz).       Anesthesia Plan      History & Physical Review  History and physical reviewed and following examination; no interval change.    ASA Status:  2 .        Plan for General with Intravenous induction.     Plan general ( with mask)      Postoperative Care  Postoperative pain management:  IV analgesics.      Consents  Anesthetic plan, risks, benefits and alternatives discussed with:  Patient..                   Gera Yanez MD  "

## 2019-05-03 NOTE — PLAN OF CARE
Pt visible around the unit; was seem playing games with other patients in the lounge. Presents with a calm affect. Attended groups and participated appropriately.

## 2019-05-04 PROCEDURE — 25000132 ZZH RX MED GY IP 250 OP 250 PS 637: Performed by: PSYCHIATRY & NEUROLOGY

## 2019-05-04 PROCEDURE — 12400000 ZZH R&B MH

## 2019-05-04 RX ADMIN — METHOCARBAMOL 500 MG: 500 TABLET, FILM COATED ORAL at 08:07

## 2019-05-04 RX ADMIN — VORTIOXETINE 15 MG: 10 TABLET, FILM COATED ORAL at 08:07

## 2019-05-04 RX ADMIN — METRONIDAZOLE: 10 GEL TOPICAL at 20:39

## 2019-05-04 RX ADMIN — ATENOLOL 25 MG: 25 TABLET ORAL at 08:06

## 2019-05-04 RX ADMIN — CHOLECALCIFEROL CAP 125 MCG (5000 UNIT) 5000 UNITS: 125 CAP at 08:06

## 2019-05-04 RX ADMIN — MIRTAZAPINE 30 MG: 30 TABLET, FILM COATED ORAL at 20:39

## 2019-05-04 RX ADMIN — QUETIAPINE FUMARATE 50 MG: 50 TABLET ORAL at 16:09

## 2019-05-04 RX ADMIN — QUETIAPINE FUMARATE 50 MG: 50 TABLET ORAL at 08:07

## 2019-05-04 RX ADMIN — POLYETHYLENE GLYCOL 3350 17 G: 17 POWDER, FOR SOLUTION ORAL at 08:07

## 2019-05-04 RX ADMIN — METHOCARBAMOL 500 MG: 500 TABLET, FILM COATED ORAL at 20:39

## 2019-05-04 RX ADMIN — METHOCARBAMOL 500 MG: 500 TABLET, FILM COATED ORAL at 16:08

## 2019-05-04 RX ADMIN — QUETIAPINE FUMARATE 150 MG: 100 TABLET ORAL at 20:38

## 2019-05-04 ASSESSMENT — ACTIVITIES OF DAILY LIVING (ADL)
LAUNDRY: WITH SUPERVISION
HYGIENE/GROOMING: INDEPENDENT
ORAL_HYGIENE: INDEPENDENT
DRESS: INDEPENDENT

## 2019-05-04 NOTE — PLAN OF CARE
Pt presents with a blunt affect, but brightens at times in conversation. Pt stated that she is feeling improved compared to when she was admitted. Has been visible and social on the unit. Played a card game with peers in the lounge. Denies SI. Med compliant.

## 2019-05-04 NOTE — PROGRESS NOTES
[facility] Psychiatric Progress Note       Interim History     The patient's care was discussed with the treatment team and chart notes were reviewed. . Pt seen on 5/03/19 by Dr. Artis. On interview, patient had ECT #5 today and is starting to acknowledge she thinks ECT is starting to help., the patient proceeds to complain of physical symptoms caused by her anxiety, such as muscle tightness. Dr. Artis would like to order Atenolol 25mg daily in order to aid in patients complaints. Patient in agreement with this. Additionally, Dr. Artis again discussed aftercare plans with patient. He deems it would be in patients best interest if she were to attend a day program to continue to support and care for her mental health. Patient however proceeds to be somewhat hesitant about this idea because she does not believe she will be able to keep up, speak out, etc. If she were to attend something like a DBT program.      Hospital Course   This is a 53 year old  female who was presented to [facility] on 4/22 after seeing Dr. Artis at his office, [facility]. Patient was very weepy, fearful, and appeared to decompensate after her discharge from [facility] on 4/16/19. Dr. Artis deemed patient to be appropriate for inpatient level care. Patient was an appropriate candidate for ECT treatment. This treatment would be able to address patients significant symptoms of anxiety and depression far more quickly in comparison to medications at this time. Patient underwent her first session on 4/24/19 as inpatient where there were no complications. Patient appeared to be much improved in comparison to admission after her first ECT session. She even noted feeling improvement in overall anxiety, however continued to complain of physical symptoms such as muscle tightness. Patient had expressed her desire to undergo her second ECT session on 4/26/19 as inpatient rather than outpatient.       Medications     Current Facility-Administered Medications Ordered in Epic   Medication Dose Route Frequency Last Rate Last Dose     acetaminophen (TYLENOL) tablet 650 mg  650 mg Oral Q4H PRN   650 mg at 05/01/19 0832     atenolol (TENORMIN) tablet 25 mg  25 mg Oral Daily   25 mg at 05/03/19 0802     cholecalciferol (VITAMIN D3) 5000 units (125 mcg) capsule 5,000 Units  5,000 Units Oral Daily   5,000 Units at 05/03/19 0802     famotidine (PEPCID) tablet 40 mg  40 mg Oral Daily PRN         hydrOXYzine (ATARAX) tablet 25-50 mg  25-50 mg Oral Q4H PRN   25 mg at 04/24/19 2058     lactated ringers infusion  500 mL Intravenous Continuous         lactated ringers infusion  500 mL Intravenous Continuous 25 mL/hr at 05/03/19 0616 500 mL at 05/03/19 0616     lidocaine 1 % 0.1-1 mL  0.1-1 mL Other Q1H PRN   1 mL at 05/03/19 0616     lidocaine 1 % 0.1-1 mL  0.1-1 mL Other Q1H PRN         lidocaine patch in PLACE   Transdermal Q8H         lidocaine patch in PLACE   Transdermal Q8H         methocarbamol (ROBAXIN) tablet 500 mg  500 mg Oral TID   500 mg at 05/03/19 1549     methylcellulose (CITRUCEL) tablet 1,000 mg  1,000 mg Oral Daily PRN   1,000 mg at 04/24/19 1201     metroNIDAZOLE (METROGEL) 1 % gel   Topical Daily         mirtazapine (REMERON) tablet 30 mg  30 mg Oral At Bedtime   30 mg at 05/02/19 2050     polyethylene glycol (MIRALAX/GLYCOLAX) Packet 17 g  17 g Oral Daily   17 g at 05/03/19 0801     propranolol (INDERAL) tablet 10 mg  10 mg Oral TID PRN   10 mg at 05/01/19 1605     QUEtiapine (SEROquel) tablet 150 mg  150 mg Oral At Bedtime   150 mg at 05/02/19 2050     QUEtiapine (SEROquel) tablet 50 mg  50 mg Oral BID   50 mg at 05/03/19 1549     sodium chloride (OCEAN) 0.65 % nasal spray 1 spray  1 spray Both Nostrils Daily PRN   1 spray at 05/01/19 2105     vortioxetine (TRINTELLIX/BRINTELLIX) tablet 15 mg  15 mg Oral Daily   15 mg at 05/03/19 0802     Current Outpatient Medications Ordered in Epic   Medication      "vortioxetine (TRINTELLIX/BRINTELLIX) 10 MG tablet         Allergies      No Known Allergies     Medical Review of Systems     /80   Pulse 102   Temp 98.5  F (36.9  C) (Oral)   Resp 16   Ht 1.6 m (5' 3\")   Wt 67.5 kg (148 lb 12.8 oz)   SpO2 97%   BMI 26.36 kg/m    Body mass index is 26.36 kg/m .  A 10-point review of systems was performed by Osei Artis MD and is negative, no new findings.      Psychiatric Examination     Appearance Sitting in chair, dressed in hospital scrubs. Appears stated age.   Attitude Cooperative   Orientation Oriented to person, place, time   Eye Contact Good   Speech Regular rate, rhythm, volume and tone   Language Normal   Psychomotor Behavior Less anxious and less agitation   Mood Anxious, hopeless    Affect A little less agitated   Thought Process Goal-Oriented, Intact   Associations Intact   Thought Content Patient is currently negative for suicidal ideation, negative for plan or intent, able to contract no self harm and identify barriers to suicide.  Negative for obsessions, compulsions or psychosis.     Fund of Knowledge Intact   Insight Poor   Judgement Limited   Attention Span & Concentration Intact   Recent & Remote Memory Intact   Gait Normal   Muscle Tone Intact        Labs     Labs reviewed.  No results found for this or any previous visit (from the past 24 hour(s)).     Impression   This is a 53 year old  female who was presented to [facility] after being seen by Dr. Artis at his office, [facility]. Patient reported to staff members that she has found some improvement in regards of her overall mood since starting ECT treatment. She has been found more visible throughout the SDU, has attended group sessions appropriately, and has been medication compliant. While meeting with Dr. Artis this afternoon, the patient proceeded to complain about physical symptoms caused by her anxiety. These symptoms include muscle tightness. Dr. Artis " has ordered Atenolol 25mg daily and increased Seroquel dose from 25mg BID to 50mg BID in order to aid in patients complaints. Patient will continue with her fifth ECT session tomorrow.      Diagnoses     1. Major depression, recurrent, severe, without psychotic features.     Plan     1. Explained side effects, benefits, and complications of medications to the patient, Pt gave verbal consent.  2. Medication changes: Increased Seroquel dose from 25mg BID to 50mg BID and started Atenolol 25mg daily   3. Discussed treatment plan with patient and team.  4. Projected length of stay: 7+ days  5. Patient will undergo her fifth ECT session on 5/03/19 as inpatient     Attestation:   Patient has been seen and evaluated by me, Osei Artis MD.    Patient ID:  Name: Celia Zhong    MRN: [id number]  Admission: 4/22/2019   YOB: 1965

## 2019-05-04 NOTE — PLAN OF CARE
Pt was present on the unit, she appears depressed, has a flat affect and is withdrawn. Conversations with staff and participation in group were superficial and minimal. She visited with her  and engaged in walking the hallways with him.

## 2019-05-05 PROCEDURE — 25000132 ZZH RX MED GY IP 250 OP 250 PS 637: Performed by: PSYCHIATRY & NEUROLOGY

## 2019-05-05 PROCEDURE — 12400000 ZZH R&B MH

## 2019-05-05 RX ADMIN — METHOCARBAMOL 500 MG: 500 TABLET, FILM COATED ORAL at 15:57

## 2019-05-05 RX ADMIN — QUETIAPINE FUMARATE 50 MG: 50 TABLET ORAL at 08:15

## 2019-05-05 RX ADMIN — Medication 1 SPRAY: at 20:50

## 2019-05-05 RX ADMIN — METHOCARBAMOL 500 MG: 500 TABLET, FILM COATED ORAL at 08:14

## 2019-05-05 RX ADMIN — VORTIOXETINE 15 MG: 10 TABLET, FILM COATED ORAL at 08:14

## 2019-05-05 RX ADMIN — CHOLECALCIFEROL CAP 125 MCG (5000 UNIT) 5000 UNITS: 125 CAP at 08:14

## 2019-05-05 RX ADMIN — QUETIAPINE FUMARATE 150 MG: 100 TABLET ORAL at 20:50

## 2019-05-05 RX ADMIN — METRONIDAZOLE: 10 GEL TOPICAL at 20:50

## 2019-05-05 RX ADMIN — QUETIAPINE FUMARATE 50 MG: 50 TABLET ORAL at 15:57

## 2019-05-05 RX ADMIN — MIRTAZAPINE 30 MG: 30 TABLET, FILM COATED ORAL at 20:49

## 2019-05-05 RX ADMIN — METHOCARBAMOL 500 MG: 500 TABLET, FILM COATED ORAL at 20:50

## 2019-05-05 RX ADMIN — ATENOLOL 25 MG: 25 TABLET ORAL at 08:15

## 2019-05-05 RX ADMIN — POLYETHYLENE GLYCOL 3350 17 G: 17 POWDER, FOR SOLUTION ORAL at 08:14

## 2019-05-05 ASSESSMENT — ACTIVITIES OF DAILY LIVING (ADL)
DRESS: STREET CLOTHES;INDEPENDENT
HYGIENE/GROOMING: INDEPENDENT
ORAL_HYGIENE: INDEPENDENT

## 2019-05-05 NOTE — PLAN OF CARE
Pt presents with sad affect and depressed mood. Pt states she feels numb and does not like the medication. Her  states she moves slower and is not herself. Pt appeared withdrawn more than normal with peers and did not wish to participate in group today. Med compliant, no Si.

## 2019-05-05 NOTE — PLAN OF CARE
"Pt presents with a blunt affect. Appears superficial in interactions, states she is doing \"good\" today. However appears withdrawn and depressed. Spent time in the lounge watching TV, and bed-rested. Denies SI. Med compliant.   "

## 2019-05-05 NOTE — PLAN OF CARE
Patient is pleasant and cooperative. States she slept well last night. Compliant with medications. Visible and social on the unit.

## 2019-05-06 ENCOUNTER — ANESTHESIA (OUTPATIENT)
Dept: SURGERY | Facility: CLINIC | Age: 54
End: 2019-05-06

## 2019-05-06 ENCOUNTER — ANESTHESIA EVENT (OUTPATIENT)
Dept: SURGERY | Facility: CLINIC | Age: 54
End: 2019-05-06

## 2019-05-06 PROCEDURE — 25000132 ZZH RX MED GY IP 250 OP 250 PS 637: Performed by: PSYCHIATRY & NEUROLOGY

## 2019-05-06 PROCEDURE — 25000128 H RX IP 250 OP 636: Performed by: NURSE ANESTHETIST, CERTIFIED REGISTERED

## 2019-05-06 PROCEDURE — 12400000 ZZH R&B MH

## 2019-05-06 PROCEDURE — 25800030 ZZH RX IP 258 OP 636: Performed by: NURSE ANESTHETIST, CERTIFIED REGISTERED

## 2019-05-06 PROCEDURE — GZB2ZZZ ELECTROCONVULSIVE THERAPY, BILATERAL-SINGLE SEIZURE: ICD-10-PCS | Performed by: PSYCHIATRY & NEUROLOGY

## 2019-05-06 PROCEDURE — 25000125 ZZHC RX 250: Performed by: NURSE ANESTHETIST, CERTIFIED REGISTERED

## 2019-05-06 PROCEDURE — 90870 ELECTROCONVULSIVE THERAPY: CPT

## 2019-05-06 PROCEDURE — 25800030 ZZH RX IP 258 OP 636: Performed by: ANESTHESIOLOGY

## 2019-05-06 PROCEDURE — 25000125 ZZHC RX 250: Performed by: ANESTHESIOLOGY

## 2019-05-06 PROCEDURE — 90853 GROUP PSYCHOTHERAPY: CPT

## 2019-05-06 RX ORDER — NALOXONE HYDROCHLORIDE 0.4 MG/ML
.1-.4 INJECTION, SOLUTION INTRAMUSCULAR; INTRAVENOUS; SUBCUTANEOUS
Status: DISCONTINUED | OUTPATIENT
Start: 2019-05-06 | End: 2019-05-06 | Stop reason: HOSPADM

## 2019-05-06 RX ORDER — ONDANSETRON 4 MG/1
4 TABLET, ORALLY DISINTEGRATING ORAL EVERY 30 MIN PRN
Status: DISCONTINUED | OUTPATIENT
Start: 2019-05-06 | End: 2019-05-06 | Stop reason: HOSPADM

## 2019-05-06 RX ORDER — SODIUM CHLORIDE, SODIUM LACTATE, POTASSIUM CHLORIDE, CALCIUM CHLORIDE 600; 310; 30; 20 MG/100ML; MG/100ML; MG/100ML; MG/100ML
INJECTION, SOLUTION INTRAVENOUS CONTINUOUS
Status: DISCONTINUED | OUTPATIENT
Start: 2019-05-06 | End: 2019-05-06 | Stop reason: HOSPADM

## 2019-05-06 RX ORDER — ONDANSETRON 2 MG/ML
4 INJECTION INTRAMUSCULAR; INTRAVENOUS EVERY 30 MIN PRN
Status: DISCONTINUED | OUTPATIENT
Start: 2019-05-06 | End: 2019-05-06 | Stop reason: HOSPADM

## 2019-05-06 RX ORDER — SODIUM CHLORIDE, SODIUM LACTATE, POTASSIUM CHLORIDE, CALCIUM CHLORIDE 600; 310; 30; 20 MG/100ML; MG/100ML; MG/100ML; MG/100ML
INJECTION, SOLUTION INTRAVENOUS ONCE
Status: COMPLETED | OUTPATIENT
Start: 2019-05-06 | End: 2019-05-06

## 2019-05-06 RX ORDER — MEPERIDINE HYDROCHLORIDE 25 MG/ML
12.5 INJECTION INTRAMUSCULAR; INTRAVENOUS; SUBCUTANEOUS
Status: DISCONTINUED | OUTPATIENT
Start: 2019-05-06 | End: 2019-05-06 | Stop reason: HOSPADM

## 2019-05-06 RX ORDER — SODIUM CHLORIDE, SODIUM LACTATE, POTASSIUM CHLORIDE, CALCIUM CHLORIDE 600; 310; 30; 20 MG/100ML; MG/100ML; MG/100ML; MG/100ML
INJECTION, SOLUTION INTRAVENOUS CONTINUOUS PRN
Status: DISCONTINUED | OUTPATIENT
Start: 2019-05-06 | End: 2019-05-06

## 2019-05-06 RX ADMIN — QUETIAPINE FUMARATE 50 MG: 50 TABLET ORAL at 15:54

## 2019-05-06 RX ADMIN — METHOCARBAMOL 500 MG: 500 TABLET, FILM COATED ORAL at 07:45

## 2019-05-06 RX ADMIN — POLYETHYLENE GLYCOL 3350 17 G: 17 POWDER, FOR SOLUTION ORAL at 07:44

## 2019-05-06 RX ADMIN — SODIUM CHLORIDE, POTASSIUM CHLORIDE, SODIUM LACTATE AND CALCIUM CHLORIDE: 600; 310; 30; 20 INJECTION, SOLUTION INTRAVENOUS at 06:39

## 2019-05-06 RX ADMIN — SODIUM CHLORIDE, POTASSIUM CHLORIDE, SODIUM LACTATE AND CALCIUM CHLORIDE: 600; 310; 30; 20 INJECTION, SOLUTION INTRAVENOUS at 06:23

## 2019-05-06 RX ADMIN — METHOCARBAMOL 500 MG: 500 TABLET, FILM COATED ORAL at 15:54

## 2019-05-06 RX ADMIN — QUETIAPINE FUMARATE 50 MG: 50 TABLET ORAL at 07:45

## 2019-05-06 RX ADMIN — METHOHEXITAL SODIUM 100 MG: 500 INJECTION, POWDER, LYOPHILIZED, FOR SOLUTION INTRAMUSCULAR; INTRAVENOUS; RECTAL at 06:41

## 2019-05-06 RX ADMIN — METRONIDAZOLE: 10 GEL TOPICAL at 21:12

## 2019-05-06 RX ADMIN — SUCCINYLCHOLINE CHLORIDE 100 MG: 20 INJECTION, SOLUTION INTRAMUSCULAR; INTRAVENOUS; PARENTERAL at 06:41

## 2019-05-06 RX ADMIN — METHOCARBAMOL 500 MG: 500 TABLET, FILM COATED ORAL at 21:12

## 2019-05-06 RX ADMIN — LIDOCAINE HYDROCHLORIDE 0.5 MG: 10 INJECTION, SOLUTION EPIDURAL; INFILTRATION; INTRACAUDAL; PERINEURAL at 06:23

## 2019-05-06 RX ADMIN — VORTIOXETINE 15 MG: 10 TABLET, FILM COATED ORAL at 07:45

## 2019-05-06 RX ADMIN — CHOLECALCIFEROL CAP 125 MCG (5000 UNIT) 5000 UNITS: 125 CAP at 07:45

## 2019-05-06 RX ADMIN — QUETIAPINE FUMARATE 150 MG: 100 TABLET ORAL at 21:12

## 2019-05-06 RX ADMIN — ACETAMINOPHEN 650 MG: 325 TABLET, FILM COATED ORAL at 07:56

## 2019-05-06 RX ADMIN — MIRTAZAPINE 30 MG: 30 TABLET, FILM COATED ORAL at 21:12

## 2019-05-06 RX ADMIN — ATENOLOL 25 MG: 25 TABLET ORAL at 07:44

## 2019-05-06 ASSESSMENT — ACTIVITIES OF DAILY LIVING (ADL)
DRESS: STREET CLOTHES
DRESS: INDEPENDENT
HYGIENE/GROOMING: INDEPENDENT
ORAL_HYGIENE: INDEPENDENT
ORAL_HYGIENE: INDEPENDENT
HYGIENE/GROOMING: INDEPENDENT
LAUNDRY: WITH SUPERVISION
LAUNDRY: WITH SUPERVISION

## 2019-05-06 ASSESSMENT — LIFESTYLE VARIABLES: TOBACCO_USE: 0

## 2019-05-06 ASSESSMENT — ENCOUNTER SYMPTOMS: SEIZURES: 0

## 2019-05-06 NOTE — PLAN OF CARE
Patient denies suicidal ideation.  Patient had her last ECT today.  She is alert and oriented. She is aware of family meeting tomorrow and states that she is ready to discharge. She is flat and speaks quietly.Attended groups and spent time in the lounge.

## 2019-05-06 NOTE — ANESTHESIA POSTPROCEDURE EVALUATION
Patient: Celia Zhong    * No procedures listed *    Diagnosis:* No pre-op diagnosis entered *  Diagnosis Additional Information: No value filed.    Anesthesia Type:  General    Note:  Anesthesia Post Evaluation    Patient location during evaluation: PACU  Patient participation: Able to fully participate in evaluation  Level of consciousness: sleepy but conscious and responsive to verbal stimuli  Pain management: adequate  Airway patency: patent  Cardiovascular status: acceptable and hemodynamically stable  Respiratory status: acceptable and unassisted  Hydration status: acceptable  PONV: none     Anesthetic complications: None          Last vitals:  Vitals:    05/06/19 0657 05/06/19 0700 05/06/19 0705   BP: 131/87 129/87 126/90   Pulse:  83 84   Resp: 10 14 8   Temp:      SpO2: 98% 97% 99%         Electronically Signed By: Puma Reeder MD  May 6, 2019  7:07 AM

## 2019-05-06 NOTE — PLAN OF CARE
Pt is pleasant/cooperative, visable and social within unit, compliant with medications, denied SI, spent some time in lounge tonight.

## 2019-05-06 NOTE — ANESTHESIA PREPROCEDURE EVALUATION
Anesthesia Pre-Procedure Evaluation    Patient: Celia Sarmientoer   MRN: [id number] : 1965          Preoperative Diagnosis: * No pre-op diagnosis entered *    * No procedures listed *    Past Medical History:   Diagnosis Date     Anxiety      Past Surgical History:   Procedure Laterality Date     TUBAL LIGATION         Anesthesia Evaluation     . Pt has had prior anesthetic.     No history of anesthetic complications          ROS/MED HX    ENT/Pulmonary:      (-) tobacco use, asthma and sleep apnea   Neurologic:      (-) seizures and CVA   Cardiovascular:         METS/Exercise Tolerance:     Hematologic:         Musculoskeletal:         GI/Hepatic:        (-) GERD   Renal/Genitourinary:         Endo:      (-) Type II DM and thyroid disease   Psychiatric:     (+) psychiatric history anxiety and depression      Infectious Disease:         Malignancy:         Other:                          Physical Exam  Normal systems: dental    Airway   Mallampati: II  TM distance: >3 FB  Neck ROM: full    Dental     Cardiovascular   Rhythm and rate: regular and normal      Pulmonary    breath sounds clear to auscultation            Lab Results   Component Value Date    WBC 7.6 04/10/2019    HGB 13.5 04/10/2019    HCT 40.2 04/10/2019     04/10/2019     2019    POTASSIUM 4.1 2019    CHLORIDE 106 2019    CO2 27 2019    BUN 14 2019    CR 0.83 2019     (H) 2019    TONY 10.2 (H) 2019    MAG 2.3 2019    TSH 1.06 04/10/2019    HCGS Negative 2019       Preop Vitals  BP Readings from Last 3 Encounters:   19 (!) 133/93   19 (!) 140/98    Pulse Readings from Last 3 Encounters:   19 108   04/15/19 107      Resp Readings from Last 3 Encounters:   19 16   19 14    SpO2 Readings from Last 3 Encounters:   19 97%   04/10/19 98%      Temp Readings from Last 1 Encounters:   19 36.6  C (97.9  F) (Oral)    Ht Readings from Last  "1 Encounters:   04/22/19 1.6 m (5' 3\")      Wt Readings from Last 1 Encounters:   04/30/19 67.5 kg (148 lb 12.8 oz)    Estimated body mass index is 26.36 kg/m  as calculated from the following:    Height as of this encounter: 1.6 m (5' 3\").    Weight as of this encounter: 67.5 kg (148 lb 12.8 oz).       Anesthesia Plan      History & Physical Review  History and physical reviewed and following examination; no interval change.    ASA Status:  2 .        Plan for General with Intravenous induction.     Plan general ( with mask)      Postoperative Care  Postoperative pain management:  IV analgesics.      Consents  Anesthetic plan, risks, benefits and alternatives discussed with:  Patient..                 Puma Reeder MD  "

## 2019-05-06 NOTE — PROGRESS NOTES
[facility] Psychiatric Progress Note       Interim History     The patient's care was discussed with the treatment team and chart notes were reviewed. Patient underwent her sixth and final ECT session this morning where there were no complications. She denies any side effects or complications with this therapy. Pt seen on 5/06/19 by Dr. Artis. On interview, the patient notes she is doing well this morning. She states she is feeling more hopeful and less fearful in regards of her future. Dr. Artis would like to have a family meeting scheduled for tomorrow, where patients  will be in attendance to discuss patients aftercare plans. This meeting will take place at 9:00am.      Hospital Course   This is a 53 year old  female who was presented to [facility] on 4/22 after seeing Dr. Artis at his office, [facility]. Patient was very weepy, fearful, and appeared to decompensate after her discharge from [facility] on 4/16/19. Dr. Artis deemed patient to be appropriate for inpatient level care. Patient was an appropriate candidate for ECT treatment. This treatment would be able to address patients significant symptoms of anxiety and depression far more quickly in comparison to medications at this time. Patient underwent her first session on 4/24/19 as inpatient where there were no complications. Patient appeared to be much improved in comparison to admission after her first ECT session. She even noted feeling improvement in overall anxiety, however continued to complain of physical symptoms such as muscle tightness. Patient had expressed her desire to undergo her second ECT session on 4/26/19 as inpatient rather than outpatient.      Medications     Current Facility-Administered Medications Ordered in Epic   Medication Dose Route Frequency Last Rate Last Dose     acetaminophen (TYLENOL) tablet 650 mg  650 mg Oral Q4H PRN   650 mg at 05/06/19 4146     atenolol (TENORMIN) tablet  "25 mg  25 mg Oral Daily   25 mg at 05/06/19 0744     cholecalciferol (VITAMIN D3) 5000 units (125 mcg) capsule 5,000 Units  5,000 Units Oral Daily   5,000 Units at 05/06/19 0745     famotidine (PEPCID) tablet 40 mg  40 mg Oral Daily PRN         hydrOXYzine (ATARAX) tablet 25-50 mg  25-50 mg Oral Q4H PRN   25 mg at 04/24/19 2058     lidocaine patch in PLACE   Transdermal Q8H         methocarbamol (ROBAXIN) tablet 500 mg  500 mg Oral TID   500 mg at 05/06/19 0745     methylcellulose (CITRUCEL) tablet 1,000 mg  1,000 mg Oral Daily PRN   1,000 mg at 04/24/19 1201     metroNIDAZOLE (METROGEL) 1 % gel   Topical Daily         mirtazapine (REMERON) tablet 30 mg  30 mg Oral At Bedtime   30 mg at 05/05/19 2049     polyethylene glycol (MIRALAX/GLYCOLAX) Packet 17 g  17 g Oral Daily   17 g at 05/06/19 0744     propranolol (INDERAL) tablet 10 mg  10 mg Oral TID PRN   10 mg at 05/01/19 1605     QUEtiapine (SEROquel) tablet 150 mg  150 mg Oral At Bedtime   150 mg at 05/05/19 2050     QUEtiapine (SEROquel) tablet 50 mg  50 mg Oral BID   50 mg at 05/06/19 0745     sodium chloride (OCEAN) 0.65 % nasal spray 1 spray  1 spray Both Nostrils Daily PRN   1 spray at 05/05/19 2050     vortioxetine (TRINTELLIX/BRINTELLIX) tablet 15 mg  15 mg Oral Daily   15 mg at 05/06/19 0745     Current Outpatient Medications Ordered in Epic   Medication     vortioxetine (TRINTELLIX/BRINTELLIX) 10 MG tablet         Allergies      No Known Allergies     Medical Review of Systems     BP (!) 129/96   Pulse 98   Temp 98  F (36.7  C) (Temporal)   Resp 18   Ht 1.6 m (5' 3\")   Wt 67.5 kg (148 lb 12.8 oz)   SpO2 95%   BMI 26.36 kg/m    Body mass index is 26.36 kg/m .  A 10-point review of systems was performed by Osei Artis MD and is negative, no new findings.      Psychiatric Examination     Appearance Sitting in chair, dressed in hospital scrubs. Appears stated age.   Attitude Cooperative   Orientation Oriented to person, place, time   Eye " Contact Good   Speech Regular rate, rhythm, volume and tone   Language Normal   Psychomotor Behavior Less anxious and less agitation   Mood More anxious    Affect Calm    Thought Process Goal-Oriented, Intact   Associations Intact   Thought Content Patient is currently negative for suicidal ideation, negative for plan or intent, able to contract no self harm and identify barriers to suicide.  Negative for obsessions, compulsions or psychosis.     Fund of Knowledge Intact   Insight Improving   Judgement Improving   Attention Span & Concentration Intact   Recent & Remote Memory Intact   Gait Normal   Muscle Tone Intact        Labs     Labs reviewed.  No results found for this or any previous visit (from the past 24 hour(s)).     Impression   This is a 53 year old  female who was presented to [facility] after being seen by Dr. Artis at his office, [facility]. Patient underwent her sixth and final ECT this morning where there were no complications. She denied any side effects such as nausea, headaches, or memory loss. Patient was able to acknowledge feeling less fearful and more  Hopeful in regards of her future and caring for her mental health. Dr. Artis would like a family meeting to take place tomorrow morning at 9:00am where patients  should be in attendance. This meeting is ordered to discuss patients aftercare plans such as patient attending an intensive outpatient program through [facility] [place].      Diagnoses     1. Major depression, recurrent, severe, without psychotic features.     Plan     1. Explained side effects, benefits, and complications of medications to the patient, Pt gave verbal consent.  2. Medication changes: none  3. Discussed treatment plan with patient and team.  4. Projected length of stay: anticipate discharge tomorrow  5. Family meeting scheduled for tomorrow at 9:00am     Attestation:   Patient has been seen and evaluated by me, Osei Artis,  MD.    Patient ID:  Name: Celia Zhong    MRN: [id number]  Admission: 4/22/2019   YOB: 1965

## 2019-05-06 NOTE — PLAN OF CARE
"  Problem: Depressive Symptoms  Goal: Depressive Symptoms  Description  1. Mood will improve and symptoms improve  2. Work through grief issues  3. Establish medication regime and manage medications   5/5/2019 0364 by Angy Uribe  Outcome: Improving     Patient presents with blunt affect, calm mood. Spent all of shift with visitors. Cooperative. Somewhat social. Brighter compared to previous shifts. Denies SI. Had 5 different visitors today. Said the visitors made today a good day. Stated she's \"feeling more positive.\" ECT 6 tomorrow. Attended wrap-up group and participated appropriately. Med-compliant.  "

## 2019-05-06 NOTE — PLAN OF CARE
Problem: Adult Behavioral Health Plan of Care  Goal: Team Discussion  Description  Team Plan:  Outcome: Improving  Note:   BEHAVIORAL TEAM DISCUSSION    Participants: Dr. Artis, , Nursing staff and PA's   Progress: Pt feels less fearful and more hopeful in her care for her mental health  Continued Stay Criteria/Rationale: Possible discharge tomorrow  Medical/Physical: N/A  Precautions:   Behavioral Orders   Procedures    Code 1 - Restrict to Unit    Electroconvulsive therapy     Series of up to 6 treatments. Begin Date: 4/24/19   Treating Psychiatrist providing ECT:  Dr. Roman   Notified on:  4/23/19    Electroconvulsive therapy     Series of up to 6 treatments. Begin Date: 4/24/19   Treating Psychiatrist providing ECT:  Dr. Roman   Notified on:  4/23/19    Electroconvulsive therapy     Series of up to 6 treatments. Begin Date: 4/24/19   Treating Psychiatrist providing ECT:  Dr. Roman   Notified on:  4/23/19    Electroconvulsive therapy     Series of up to 6 treatments. Begin Date: 4/24/19   Treating Psychiatrist providing ECT:  Dr. Roman   Notified on:  4/23/19    Electroconvulsive therapy     Series of up to 6 treatments. Begin Date: 4/24/19   Treating Psychiatrist providing ECT:  Dr. Roman   Notified on:  4/23/19    Routine Programming     As clinically indicated    Status 15     Every 15 minutes.     Plan: Pt was given medication information and gave verbal consent. Plan of care was discussed with patient and team. Pt completed ECT #6 this morning. Pt will have a family meeting tomorrow at 9am with  present. Pt will discharge tomorrow and will consider an IOP through [facility]. Continue hospitalization overnight on current medications.  Rationale for change in precautions or plan: Pt should be adequate for discharge tomorrow.

## 2019-05-06 NOTE — PROCEDURES
[facility] ECT Procedure Note     Celia MORGAN Stifter [id number]   53 year old 1965     Patient Status: Inpatient    No Known Allergies    Weight:  148 lbs 12.8 oz    Patient Preparations: (none)         Diagnosis:   Major depression       Indications for ECT:   Medications ineffective       Pause for the Cause:     Right patient Yes   Right procedure/laterality settings: Yes   Right diagnosis Yes          Intra-Procedure Documentation:     Date:  5/6/2019  Time:  6:22 AM    ECT #    Treatment number this series: 6   Total treatment number: 6   Type of ECT:  Bilateral, standard    ECT Medications administered: See MAR and Anesthesia record         Clinical Narrative:     ECT was administered by Thymatron machine.  Pt has been medically cleared for procedure, consent signed. Side effects, Risks and benefits reviewed.    ECT Strip Summary:   Energy Level: 100 percent  Motor Seizure Duration: 30 seconds  EEG Seizure Duration: 30 seconds    Complications: No    Plan: done  Outpatient Psychiatrist: Dr Artis

## 2019-05-07 VITALS
HEART RATE: 86 BPM | BODY MASS INDEX: 27.12 KG/M2 | SYSTOLIC BLOOD PRESSURE: 123 MMHG | RESPIRATION RATE: 17 BRPM | TEMPERATURE: 98.3 F | WEIGHT: 153.1 LBS | DIASTOLIC BLOOD PRESSURE: 85 MMHG | OXYGEN SATURATION: 95 % | HEIGHT: 63 IN

## 2019-05-07 PROCEDURE — 25000132 ZZH RX MED GY IP 250 OP 250 PS 637: Performed by: PSYCHIATRY & NEUROLOGY

## 2019-05-07 PROCEDURE — 90846 FAMILY PSYTX W/O PT 50 MIN: CPT

## 2019-05-07 RX ORDER — ATENOLOL 25 MG/1
25 TABLET ORAL DAILY
Qty: 30 TABLET | Refills: 0 | Status: SHIPPED | OUTPATIENT
Start: 2019-05-07 | End: 2019-06-06

## 2019-05-07 RX ORDER — QUETIAPINE FUMARATE 50 MG/1
50 TABLET, FILM COATED ORAL 2 TIMES DAILY
Qty: 60 TABLET | Refills: 0 | Status: SHIPPED | OUTPATIENT
Start: 2019-05-07 | End: 2019-06-06

## 2019-05-07 RX ADMIN — CHOLECALCIFEROL CAP 125 MCG (5000 UNIT) 5000 UNITS: 125 CAP at 08:13

## 2019-05-07 RX ADMIN — ATENOLOL 25 MG: 25 TABLET ORAL at 09:52

## 2019-05-07 RX ADMIN — VORTIOXETINE 15 MG: 10 TABLET, FILM COATED ORAL at 08:13

## 2019-05-07 RX ADMIN — METHOCARBAMOL 500 MG: 500 TABLET, FILM COATED ORAL at 08:13

## 2019-05-07 RX ADMIN — QUETIAPINE FUMARATE 50 MG: 50 TABLET ORAL at 08:13

## 2019-05-07 RX ADMIN — POLYETHYLENE GLYCOL 3350 17 G: 17 POWDER, FOR SOLUTION ORAL at 08:12

## 2019-05-07 ASSESSMENT — ACTIVITIES OF DAILY LIVING (ADL)
ORAL_HYGIENE: INDEPENDENT
DRESS: STREET CLOTHES
HYGIENE/GROOMING: INDEPENDENT

## 2019-05-07 ASSESSMENT — MIFFLIN-ST. JEOR: SCORE: 1258.59

## 2019-05-07 NOTE — PLAN OF CARE
Problem: Depressive Symptoms  Goal: Depressive Symptoms  Description  1. Mood will improve and symptoms improve  2. Work through grief issues  3. Establish medication regime and manage medications   5/6/2019 2158 by Diogenes Flaherty  Outcome: Improving  Flowsheets  Taken 4/24/2019 2220  Depressive Symptoms Assessed: all  Taken 5/6/2019 2154  Depressive Symptoms Present: affect  Note:   Pt was alert and oriented within the SDU. Pt presents with a blunt affect and calm mood. Pt is minimally social but warm and conversive upon approach. Pt was visited by her  which helped improve her mode. Pt declined activities group to spend time with her , but attended wrap up group. Pt stated that she had a good day and is ready for discharge. Pt ate all of her food and was med compliant.

## 2019-05-07 NOTE — DISCHARGE SUMMARY
[facility] Psychiatric Discharge Summary      DATE OF ADMISSION: 4/22/2019     DATE OF DISCHARGE: 5/07/19    PRIMARY CARE PHYSICIAN: Nicho Mata    IDENTIFICATION: For history, see dictation by Dr. Artis on 4/23/19. For physical summary, see dictation by Zachariah Amezquita PA-C on 4/23/19.     HOSPITAL COURSE:   This is a 53 year old  female who was presented to [facility] on 4/22 after seeing Dr. Artis at his office, [facility]. Patient was very weepy, fearful, and appeared to decompensate after her discharge from [facility] on 4/16/19. Dr. Artis deemed patient to be appropriate for inpatient level care. Patient was an appropriate candidate for ECT treatment. This treatment would be able to address patients significant symptoms of anxiety and depression far more quickly in comparison to medications at this time. Patient underwent her first session on 4/24/19 as inpatient where there were no complications. She denied any side effects such as nausea, headaches, or memory loss. Patient appeared to be much improved in comparison to admission even after her first ECT session. She even noted feeling improvement in overall anxiety, however continued to complain of physical symptoms such as muscle tightness. Patient underwent her sixth and final ECT on 5/06/19. Patient was able to acknowledge feeling less fearful and more  hopeful in regards of her future and caring for her mental health. A family meeting took place on 5/07/19, where patients  was in attendance. From this meeting, the patients medications, current state, ECT treatment, and aftercare plans were heavily discussed.     DISCHARGE MENTAL STATUS EXAMINATION:  Appearance Sitting in chair, dressed in normal clothing. Appears stated age.   Attitude Cooperative   Orientation Oriented to person, place, time   Eye Contact Good   Speech Regular rate, rhythm, volume and tone   Language Normal   Psychomotor Behavior Intact  "  Mood Less anxious, more calm    Affect Calm    Thought Process Goal-Oriented, Intact   Associations Intact   Thought Content Patient is currently negative for suicidal ideation, negative for plan or intent, able to contract no self harm and identify barriers to suicide.  Negative for obsessions, compulsions or psychosis.     Fund of Knowledge Intact   Insight Improving   Judgement Improving   Attention Span & Concentration Intact   Recent & Remote Memory Intact   Gait Normal   Muscle Tone Intact       LABORATORY DATA:    Refer to hospitalist admission dictation.  No results found for this or any previous visit (from the past 24 hour(s)).     /85   Pulse 86   Temp 98.3  F (36.8  C) (Oral)   Resp 17   Ht 1.6 m (5' 3\")   Wt 69.4 kg (153 lb 1.6 oz)   SpO2 95%   BMI 27.12 kg/m       DISCHARGE MEDICATIONS:      Review of your medicines      START taking      Dose / Directions   vortioxetine 10 MG tablet  Commonly known as:  TRINTELLIX/BRINTELLIX      Dose:  10 mg  Take 1 tablet (10 mg) by mouth daily  Refills:  0        CONTINUE these medicines which have NOT CHANGED      Dose / Directions   cholecalciferol 5000 units (125 mcg) capsule  Commonly known as:  VITAMIN D3      Dose:  5000 Units  Take 5,000 Units by mouth daily  Refills:  0     famotidine 40 MG tablet  Commonly known as:  PEPCID  Used for:  Anxiety      Dose:  40 mg  Take 1 tablet (40 mg) by mouth daily as needed  Quantity:  30 tablet  Refills:  0     methylcellulose 500 MG Tabs tablet  Commonly known as:  CITRUCEL      Dose:  1000 mg  Take 1,000 mg by mouth daily as needed  Refills:  0     metroNIDAZOLE 1 % external gel  Commonly known as:  METROGEL      Apply topically daily To face for rosacea  Refills:  0     mirtazapine 30 MG tablet  Commonly known as:  REMERON  Used for:  Anxiety      Dose:  30 mg  Take 1 tablet (30 mg) by mouth At Bedtime  Quantity:  30 tablet  Refills:  0     propranolol 10 MG tablet  Commonly known as:  INDERAL  Used " for:  Anxiety      Dose:  10 mg  Take 1 tablet (10 mg) by mouth 3 times daily as needed (anxiety)  Quantity:  90 tablet  Refills:  0     * QUEtiapine 50 MG tablet  Commonly known as:  SEROquel      Dose:  150 mg  Take 150 mg by mouth At Bedtime  Refills:  0     * QUEtiapine 25 MG tablet  Commonly known as:  SEROquel      Dose:  25 mg  Take 25 mg by mouth 2 times daily 0800 and 1600  Refills:  0     sodium chloride 0.65 % nasal spray  Commonly known as:  OCEAN      Dose:  1 spray  Spray 1 spray into both nostrils daily as needed for congestion  Refills:  0         * This list has 2 medication(s) that are the same as other medications prescribed for you. Read the directions carefully, and ask your doctor or other care provider to review them with you.            STOP taking    UNKNOWN TO PATIENT               DISCHARGE DIAGNOSES:  1. Major depression, recurrent, severe, without psychotic features.    DISCHARGE PLAN:   1. Continue with current medication regiment: Trintellix 20mg daily, Seroquel 50mg BID, Seroquel 150mg at bedtime  2. Patient will immediately  4 boxes of Trintellix 20mg samples at [facility]   3. Patient will follow-up with Dr. Artis at [facility]  4. Patient plans to undergo the intensive outpatient program (6 week program) at [facility] in [place], [place]      DISCHARGE FOLLOW-UP:  See Reconciliation Note     Attestation:   Patient has been seen and evaluated by me, Osei Artis MD.    Patient ID:    Name: Celia Zhong MRN: [id number]  Admission: 4/22/2019  YOB: 1965

## 2019-05-07 NOTE — DISCHARGE INSTRUCTIONS
Behavioral Discharge Planning and Instructions    Summary:  Admitted to [facility] for worsening depression with inability to function.    Main Diagnosis:  Major Depression, recurrent, severe, without psychotic features.     Major Treatments, Procedures and Findings:  Psychiatric assessment. ECT.     Symptoms to Report: Increased confusion, Losing more sleep or sleeping too much, Mood getting worse or Thoughts of suicide    Lifestyle Adjustment: Follow all treatment recommendations. Develop and follow safety plan. Your safety plan is your contract with yourself to keep you safe in a crisis. Be sure to use the resources and crisis numbers listed on your safety plan. Do not drive for at least one week following your last ECT treatment.     Psychiatry Follow-up:     Appointment with Dr. Artis on Wednesday 5/15/19 @ 11:30 am.  [facility]  [address], [address]  [place]  [address]  Phone:  [phone] / Fax:  [phone]   Trintellix samples at office.    Please follow up with your therapist, Susu Crum, at [facility] as discussed with [facility].  [facility]- return on Wednesday 5/8/19 @ 9 am.  [facility] ([facility] known [facility])  [address]   [address]  [place] [address] [phone] / [phone] fax    Resources:   Crisis Intervention: 607.953.3732 or 336-016-7882 (TTY: [phone]).  Call anytime for help.  National [place] on Mental Illness (www.[place].clara.org): 421.429.3992 or 441-553-5172.  National Suicide Prevention Line (www.mentalhealthmn.org): 325-704-DRSD (4123)  [facility] Crisis Line - 24/7 mobile and telephone crisis response services in [facility]. One central access number: [phone] for all services.      General Medication Instructions:   See your medication sheet(s) for instructions.   Take all medicines as directed.  Make no changes unless your doctor suggests them.   Go to all your  doctor visits.  Be sure to have all your required lab tests. This way, your medicines can be refilled on time.  Do not use any drugs not prescribed by your doctor.  Avoid alcohol.

## 2019-05-07 NOTE — PROGRESS NOTES
Met with patient and her  with Dr. Artis and donte. All agree patient is better than she was, including patient. She is still  Doubtful about her progress, though agrees she is not as  Panicky and locked in place as she has been. Patient is returning to [facility] tomorrow and has appointment with Dr. Artis the following week. Trintellix sampls to be picked up at doctor's office. Patient discharging from hospital today,

## 2019-05-07 NOTE — PROGRESS NOTES
Patient denies suicidal ideation.  Reviewed discharge instructions with patient and her .  Patient has a copy of discharge instructions and verbalizes understanding.  Patient was told by Dr. Artis(according to the  of the patient) to use the 50 mg seroquel bid instead of 25 mg bid

## 2019-05-07 NOTE — PROGRESS NOTES
[facility] Psychiatric Progress Note       Interim History     The patient's care was discussed with the treatment team and chart notes were reviewed. A family meeting is currently taking place where patients  is in attendance. From this meeting, the patients current state, medications, ECT treatment, and aftercare plans are heavily discussed. When patient brought into meeting, she notes she is feeling fine today. Her steady progress is addressed, patient is finally able to acknowledge that she has improved in mood since admission. Aftercare plans, such as [facility] [place], were considered. Patient plans to attend this IOP and follow-up with Dr. Artis [facility].      Hospital Course   This is a 53 year old  female who was presented to [facility] on 4/22 after seeing Dr. Artis at his office, [facility]. Patient was very weepy, fearful, and appeared to decompensate after her discharge from [facility] on 4/16/19. Dr. Artis deemed patient to be appropriate for inpatient level care. Patient was an appropriate candidate for ECT treatment. This treatment would be able to address patients significant symptoms of anxiety and depression far more quickly in comparison to medications at this time. Patient underwent her first session on 4/24/19 as inpatient where there were no complications. She denied any side effects such as nausea, headaches, or memory loss. Patient appeared to be much improved in comparison to admission even after her first ECT session. She even noted feeling improvement in overall anxiety, however continued to complain of physical symptoms such as muscle tightness. Patient underwent her sixth and final ECT on 5/06/19. Patient was able to acknowledge feeling less fearful and more  hopeful in regards of her future and caring for her mental health. A family meeting took place on 5/07/19, where patients  was in attendance. From this  "meeting, the patients medications, current state, ECT treatment, and aftercare plans were heavily discussed.      Medications     Current Facility-Administered Medications Ordered in Epic   Medication Dose Route Frequency Last Rate Last Dose     acetaminophen (TYLENOL) tablet 650 mg  650 mg Oral Q4H PRN   650 mg at 05/06/19 0756     atenolol (TENORMIN) tablet 25 mg  25 mg Oral Daily   25 mg at 05/06/19 0744     cholecalciferol (VITAMIN D3) 5000 units (125 mcg) capsule 5,000 Units  5,000 Units Oral Daily   5,000 Units at 05/07/19 0813     famotidine (PEPCID) tablet 40 mg  40 mg Oral Daily PRN         hydrOXYzine (ATARAX) tablet 25-50 mg  25-50 mg Oral Q4H PRN   25 mg at 04/24/19 2058     lidocaine patch in PLACE   Transdermal Q8H   Stopped at 05/07/19 0811     methocarbamol (ROBAXIN) tablet 500 mg  500 mg Oral TID   500 mg at 05/07/19 0813     methylcellulose (CITRUCEL) tablet 1,000 mg  1,000 mg Oral Daily PRN   1,000 mg at 04/24/19 1201     metroNIDAZOLE (METROGEL) 1 % gel   Topical Daily         mirtazapine (REMERON) tablet 30 mg  30 mg Oral At Bedtime   30 mg at 05/06/19 2112     polyethylene glycol (MIRALAX/GLYCOLAX) Packet 17 g  17 g Oral Daily   17 g at 05/07/19 0812     propranolol (INDERAL) tablet 10 mg  10 mg Oral TID PRN   10 mg at 05/01/19 1605     QUEtiapine (SEROquel) tablet 150 mg  150 mg Oral At Bedtime   150 mg at 05/06/19 2112     QUEtiapine (SEROquel) tablet 50 mg  50 mg Oral BID   50 mg at 05/07/19 0813     sodium chloride (OCEAN) 0.65 % nasal spray 1 spray  1 spray Both Nostrils Daily PRN   1 spray at 05/05/19 2050     vortioxetine (TRINTELLIX/BRINTELLIX) tablet 15 mg  15 mg Oral Daily   15 mg at 05/07/19 0813     Current Outpatient Medications Ordered in Epic   Medication     vortioxetine (TRINTELLIX/BRINTELLIX) 10 MG tablet         Allergies      No Known Allergies     Medical Review of Systems     /85   Pulse 86   Temp 98.3  F (36.8  C) (Oral)   Resp 17   Ht 1.6 m (5' 3\")   Wt 69.4 " kg (153 lb 1.6 oz)   SpO2 95%   BMI 27.12 kg/m    Body mass index is 27.12 kg/m .  A 10-point review of systems was performed by Osei Artis MD and is negative, no new findings.      Psychiatric Examination     Appearance Sitting in chair, dressed in hospital scrubs. Appears stated age.   Attitude Cooperative   Orientation Oriented to person, place, time   Eye Contact Good   Speech Regular rate, rhythm, volume and tone   Language Normal   Psychomotor Behavior Less anxious and less agitation   Mood More anxious    Affect Calm    Thought Process Goal-Oriented, Intact   Associations Intact   Thought Content Patient is currently negative for suicidal ideation, negative for plan or intent, able to contract no self harm and identify barriers to suicide.  Negative for obsessions, compulsions or psychosis.     Fund of Knowledge Intact   Insight Improving   Judgement Improving   Attention Span & Concentration Intact   Recent & Remote Memory Intact   Gait Normal   Muscle Tone Intact        Labs     Labs reviewed.  No results found for this or any previous visit (from the past 24 hour(s)).     Impression   This is a 53 year old  female who was presented to [facility] after being seen by Dr. Artis at his office, [facility]. A family meeting took place this morning, where patients  was in attendance. Patients current state, medication adjustments, ECT course, and aftercare plans were discussed in detail. Patient will follow-up with Dr. Artis at [facility] and [facility].      Diagnoses     1. Major depression, recurrent, severe, without psychotic features.     Plan     1. Explained side effects, benefits, and complications of medications to the patient, Pt gave verbal consent.  2. Medication changes: none  3. Discussed treatment plan with patient and team.  4. Projected length of stay:  discharge today  5. Patient will attend the  [facility] at [facility] in [place], [place]    Attestation:   Patient has been seen and evaluated by me, Osei Artis MD.    Patient ID:  Name: Celia Zhong    MRN: [id number]  Admission: 4/22/2019   YOB: 1965

## 2022-02-02 NOTE — PLAN OF CARE
Problem: Adult Behavioral Health Plan of Care  Goal: Team Discussion  Description  Team Plan:  Outcome: No Change  Note:   BEHAVIORAL TEAM DISCUSSION    Participants: Dr. Artis, , Nursing staff and PA's   Progress: Pt remains anxious  Continued Stay Criteria/Rationale: Until stabilized on medication with aftercare in place  Medical/Physical: Increase Remeron and Seroquel   Precautions:   Behavioral Orders   Procedures    Code 1 - Restrict to Unit    Routine Programming     As clinically indicated    Status 15     Every 15 minutes.     Plan: Pt was given medication information and gave verbal consent. Plan of care was discussed with patient and team. Increase Remeron to 30mg at bedtime and Seroquel 50mg Bid with 150mg at bedtime. Pt will have a family meeting tomorrow at 9am to discuss plans for future discharge. Pt is open to attending [facility] post discharge. Continue hospitalization at this time.  Rationale for change in precautions or plan: Further stabilization of anxiety and to aid in sleep.         
"Patient hyper focused on her anxiety this shift. States she is \"afraid my legs won't work.\"  \"afraid my body is shutting down\" and afraid \"I might have a heart attack while walking\" (no s/s of heart attack)  Patient made use of PRN medications as well as fidget putty,  the exorcize bike and breathing exercises. . Appeared to get some relief from these methods, but states she needs \"to figure out who I am to feel better.\"  visited. Visit went well. Mood anxious. Affect tense, blunt and flat.   "
"Pt appeared very anxious this morning, stating \" I just can't do or think about anything positive.\" \"I just don't think I'm going to get any better\". Reassured pt that she was taking positive baby steps forward by  interacting with others and going to groups, practicing her deep breathing etc. Prn propanolol and seroquel also given. Pt had many visitors, which she enjoyed. Med compliant  "
"Pt attended 2 of 2 OT groups today. Pt transitioned I'lly to OT group and engaged in therapeutic activity addressing functional cognition and interpersonal skills with MIN encouragement. With task set up, pt participated in therapeutic group activity and discussion addressing self care and illness management. Educated pt various types of self care and the importance of practicing good self-care with pt verbalizing understanding. Pt reports she would like to improve on personal hygiene, participating in hobbies, and asking others for help when needed. With MIN A, problem-solved strategies to work towards same. Additionally, with task set up, pt created wellness plan, ID'ing what she can do daily to stay healthy (yoga), what she needs to avoid to stay well, goals for the future, positive self talk (\"I am calm, I am relaxed, I enjoy the moment\"), and what others can do to help her.  Pt will continue to benefit from OT intervention to address implementation of positive functional coping skills, role performance, and community reintegration.     "
"Pt presents with calm mood and less anxiety compared to previous days. Pt  Affect is blunt that brightens upon approach. Pt attended groups and participated appropriately. Pt denies any SI and continues to State' I just need to find something I can find interest in\" Took 12.5 mg prn Seroquel po. Pt is med compliant.      "
"Pt states was having dreams during the night, starting last night when meds increased. Pt states that is very unusual for her, but overall is sleeping better than at home, \"but that Seroquel really knocks me out\". Pt was pleased to be visited today by her sisters and later by her . Appears less anxious than when admitted. Pt has received prn Inderal today.  Pt hopes to discharge early in the week. Participated in Wrap-up group; med compliant, eating well.Denies suicidal thoughts. Elevated BP slightly better.  "
Flat affect, pt endorsed having a lot of anxiety, offered her prn propanolol and Seroquel as well as discussed other methods to help decrease anxiety (deep breathing, journaling, guided imagery). Pt open to trying these. Attended morning group as well as OT. Visible out in lounge working on word searches or riding exercise bike. Plesant when interacting with staff. C/o of sore throat, dry cough, prn throat lozenges offered. Med compliant  
Goal: Team Discussion  Outcome: No change   BEHAVIORAL TEAM DISCUSSION     Participants: Dr. Artis, social workers, nurses, psych asst   Progress: Pt is anxious, pt does not see that she is anxious. Pt does acknowledge that Seroquel helps, pt has been attending groups.   Continued Stay Criteria/Rationale: Pt will remain on the unit, pt will attend [facility] after discharge. Staff should continue to push PRNs.  Medical/Physical:Seroquel 50 MG Bedtime, PRN every 2 hrs; Remeron 7.5 MG  Precautions:        Behavioral Orders   Procedures    Code 1 - Restrict to Unit    Routine Programming       As clinically indicated    Seizure precautions    Status 15       Every 15 minutes.      Plan: Pt will remain on the unit, pt continues to show anxiety. Pt meds are being adjusted.   Rationale for change in precautions or plan: Pt will remain on the unit for further observation and treatment.     
Patient presents with a blunted affect and calm mood. She states she likes her med changes and is considering IOP. Her  visited, which appeared to go well. She was often isolative to her room.  Patient did not attend evening groups. Family meeting with  tomorrow 9am.   
Patient visible this shift, pleasant and cooperative. Appears tense at times and admits to feeling anxious periodically. Did not attend groups.  visited, appeared to go well. During staff check-in patient said she may try PRN propranolol tomorrow (Sat) for her anxiety. Scheduled HS Remeron (15mg) and Seroquel (100mg) both at an increased dose tonight. Denies suicidal ideation & contracts for safety.   
Pt  HR fluctuates between  Pt states she has never taken medications before and is nervous about taking them. She states this anxiety is new for her and her work has been toxic for the past 2 years. She is also in jaxson menopause and has night sweats where she has to change her clothes. She states she did sleep well last night and took 25 mg of Seroquel. Pt was offered Propranolol for rapid pulse but declined for now..Seroquel prn ordered.   
Pt attended 2 of 2 OT groups today. Pt transitioned to OT group with MIN encouragement and engaged in therapeutic activity addressing functional cognition and interpersonal skills with MIN VCs. Pt participated in therapeutic group activity and discussion addressing healthy coping. With MIN VCs pt participated in IDing if various coping strategies are either healthy, unhealthy, or neutral and why. After completing, pt ID'd new coping strategies she would like to implement in the future including investing in a new hobby (crafts). With MIN A, problem-solved how to implement same after discharge. In PM, pt actively participated in a mindfulness group focusing on reduction of anxiety, improvement of mood, and increase in concentration. The mindfulness practice was offered via supportive approaches including yoga and body scan meditation to decrease worry, rumination, and other negative feelings. Pt was able to remain focused for the full duration. Pt will continue to benefit from OT intervention to address implementation of positive functional coping skills, role performance, and community reintegration.     
Pt attended Exercise group, but left early from playing Wii.  visited. Pt had questions about medications and was given a copy of her current med list; questions answered. Pt is less tearful than yesterday, but quite anxious and was given Seroquel 12.5 mg twice today. Started Remeron tonight. Denies suicidal thoughts.  
Yes